# Patient Record
Sex: FEMALE | Race: BLACK OR AFRICAN AMERICAN | NOT HISPANIC OR LATINO | Employment: FULL TIME | URBAN - METROPOLITAN AREA
[De-identification: names, ages, dates, MRNs, and addresses within clinical notes are randomized per-mention and may not be internally consistent; named-entity substitution may affect disease eponyms.]

---

## 2017-01-30 ENCOUNTER — OFFICE VISIT (OUTPATIENT)
Dept: FAMILY MEDICINE | Facility: CLINIC | Age: 47
End: 2017-01-30
Payer: COMMERCIAL

## 2017-01-30 VITALS
DIASTOLIC BLOOD PRESSURE: 80 MMHG | WEIGHT: 143.19 LBS | HEIGHT: 60 IN | TEMPERATURE: 99 F | BODY MASS INDEX: 28.11 KG/M2 | SYSTOLIC BLOOD PRESSURE: 120 MMHG | HEART RATE: 71 BPM | OXYGEN SATURATION: 99 %

## 2017-01-30 DIAGNOSIS — J01.80 OTHER ACUTE SINUSITIS: Primary | ICD-10-CM

## 2017-01-30 PROCEDURE — 99213 OFFICE O/P EST LOW 20 MIN: CPT | Mod: 25,S$GLB,,

## 2017-01-30 PROCEDURE — 1159F MED LIST DOCD IN RCRD: CPT | Mod: S$GLB,,,

## 2017-01-30 PROCEDURE — 96372 THER/PROPH/DIAG INJ SC/IM: CPT | Mod: S$GLB,,,

## 2017-01-30 PROCEDURE — 99999 PR PBB SHADOW E&M-EST. PATIENT-LVL III: CPT | Mod: PBBFAC,,,

## 2017-01-30 RX ORDER — TRIAMCINOLONE ACETONIDE 40 MG/ML
40 INJECTION, SUSPENSION INTRA-ARTICULAR; INTRAMUSCULAR
Status: COMPLETED | OUTPATIENT
Start: 2017-01-30 | End: 2017-01-30

## 2017-01-30 RX ORDER — PROMETHAZINE HYDROCHLORIDE AND CODEINE PHOSPHATE 6.25; 1 MG/5ML; MG/5ML
5 SOLUTION ORAL EVERY 4 HOURS PRN
Qty: 180 ML | Refills: 0 | Status: SHIPPED | OUTPATIENT
Start: 2017-01-30 | End: 2017-02-09

## 2017-01-30 RX ADMIN — TRIAMCINOLONE ACETONIDE 40 MG: 40 INJECTION, SUSPENSION INTRA-ARTICULAR; INTRAMUSCULAR at 12:01

## 2017-01-30 NOTE — MR AVS SNAPSHOT
[facility]  [address]  [place] [address]  Phone: [phone]  Fax: [phone]                  Herminia Boyle   2017 11:40 AM   Office Visit    Description:  Female : 1970   Provider:  Morgan Arreola Jr., MD   Department:  [facility]           Reason for Visit     Sinus Problem           Diagnoses this Visit        Comments    Other acute sinusitis    -  Primary            To Do List           Goals (5 Years of Data)     None      Follow-Up and Disposition     Return if symptoms worsen or fail to improve.       These Medications        Disp Refills Start End    promethazine-codeine 6.25-10 mg/5 ml (PHENERGAN WITH CODEINE) 6.25-10 mg/5 mL syrup 180 mL 0 2017    Take 5 mLs by mouth every 4 (four) hours as needed. - Oral    Pharmacy: [facility] [place]   [facility] Ph #: [phone]         [facility] On [facility]     [facility] On Call Nurse Care Line -  Assistance  Registered nurses in the [facility] On Call Center provide clinical advisement, health education, appointment booking, and other advisory services.  Call for this free service at [phone].             Medications           Message regarding Medications     Verify the changes and/or additions to your medication regime listed below are the same as discussed with your clinician today.  If any of these changes or additions are incorrect, please notify your healthcare provider.        START taking these NEW medications        Refills    promethazine-codeine 6.25-10 mg/5 ml (PHENERGAN WITH CODEINE) 6.25-10 mg/5 mL syrup 0    Sig: Take 5 mLs by mouth every 4 (four) hours as needed.    Class: Normal    Route: Oral      These medications were administered today        Dose Freq    triamcinolone acetonide injection 40 mg 40 mg Clinic/HOD 1 time    Sig: Inject 1 mL (40 mg total) into the muscle one time.    Class: Normal    Route: Intramuscular           Verify  that the below list of medications is an accurate representation of the medications you are currently taking.  If none reported, the list may be blank. If incorrect, please contact your healthcare provider. Carry this list with you in case of emergency.           Current Medications     levocetirizine (XYZAL) 5 MG tablet Take 1 tablet (5 mg total) by mouth every evening.    lisinopril (PRINIVIL,ZESTRIL) 20 MG tablet     ondansetron (ZOFRAN-ODT) 8 MG TbDL Take 1 tablet (8 mg total) by mouth every 12 (twelve) hours as needed.    promethazine-codeine 6.25-10 mg/5 ml (PHENERGAN WITH CODEINE) 6.25-10 mg/5 mL syrup Take 5 mLs by mouth every 4 (four) hours as needed.           Clinical Reference Information           Vital Signs - Last Recorded  Most recent update: 1/30/2017 11:48 AM by Travis Bolden MA    BP Pulse Temp Ht Wt SpO2    120/80 (BP Location: Right arm, Patient Position: Sitting, BP Method: Manual) 71 98.7 °F (37.1 °C) 5' (1.524 m) 64.9 kg (143 lb 3 oz) 99%    BMI                27.96 kg/m2          Blood Pressure          Most Recent Value    BP  120/80      Allergies as of 1/30/2017     No Known Allergies      Immunizations Administered on Date of Encounter - 1/30/2017     None      [facility] Sign-Up     Activating your [facility] account is as easy as 1-2-3!     1) Visit [email or web address], select Sign Up Now, enter this activation code and your date of birth, then select Next.  Activation code not generated  Current Patient Portal Status: Account disabled      2) Create a username and password to use when you visit [facility] in the future and select a security question in case you lose your password and select Next.    3) Enter your e-mail address and click Sign Up!    Additional Information  If you have questions, please e-mail [email or web address] or call [phone] to talk to our [facility] staff. Remember, [facility] is NOT to be used for urgent needs. For medical emergencies, dial 911.

## 2017-01-30 NOTE — PROGRESS NOTES
Chief Complaint   Patient presents with    Sinus Problem       HPI  Herminia Boyle is a 46 y.o. female who presents to the office after about 2 days of sinus drip, lots of sniffles, cough.  No chills, fever, no sore throat, no ear pain.  Patient is generally in good health, her PCP is Dr. Hill.      HPI    PAST MEDICAL HISTORY:  Past Medical History   Diagnosis Date    Acid reflux     Allergy     Anemia     Hypertension        PAST SURGICAL HISTORY:  Past Surgical History   Procedure Laterality Date    Tubal ligation         SOCIAL HISTORY:  Social History     Social History    Marital status: Single     Spouse name: N/A    Number of children: N/A    Years of education: N/A     Occupational History    Not on file.     Social History Main Topics    Smoking status: Former Smoker    Smokeless tobacco: Not on file    Alcohol use 0.0 oz/week     0 Standard drinks or equivalent per week      Comment: socially    Drug use: No    Sexual activity: Yes     Other Topics Concern    Not on file     Social History Narrative       FAMILY HISTORY:  Family History   Problem Relation Age of Onset    Hypertension Mother     Hypertension Sister        ALLERGIES AND MEDICATIONS: updated and reviewed.  Review of patient's allergies indicates:  No Known Allergies  Current Outpatient Prescriptions   Medication Sig Dispense Refill    levocetirizine (XYZAL) 5 MG tablet Take 1 tablet (5 mg total) by mouth every evening. 30 tablet 0    lisinopril (PRINIVIL,ZESTRIL) 20 MG tablet   5    ondansetron (ZOFRAN-ODT) 8 MG TbDL Take 1 tablet (8 mg total) by mouth every 12 (twelve) hours as needed. 12 tablet 0    promethazine-codeine 6.25-10 mg/5 ml (PHENERGAN WITH CODEINE) 6.25-10 mg/5 mL syrup Take 5 mLs by mouth every 4 (four) hours as needed. 180 mL 0     No current facility-administered medications for this visit.        ROS  Review of Systems   Constitutional: Negative for appetite change and fever.   HENT: Negative for  ear pain and sinus pressure.    Respiratory: Positive for cough. Negative for wheezing.        Physical Exam  Vitals:    01/30/17 1146   BP: 120/80   Pulse: 71   Temp: 98.7 °F (37.1 °C)    Body mass index is 27.96 kg/(m^2).  Weight: 64.9 kg (143 lb 3 oz)   Height: 5' (152.4 cm)     Physical Exam   Constitutional: She appears well-developed and well-nourished. No distress.   HENT:   Tympanic membranes are normal.  Sinuses are nontender to percussion.  Oropharynx is clear without exudate or erythema.   Eyes: Conjunctivae are normal. Pupils are equal, round, and reactive to light.   Cardiovascular: Normal rate and regular rhythm.    Pulmonary/Chest: Effort normal and breath sounds normal.   Clear lungs.   Vitals reviewed.      Health Maintenance       Date Due Completion Date    Lipid Panel 1970 ---    TETANUS VACCINE 8/20/1988 ---    Influenza Vaccine 8/1/2016 ---    Mammogram 1/30/2017 1/30/2015 (Done)    Override on 1/30/2015: Done    Pap Smear 12/19/2019 12/19/2016          Assessment & Plan    1. Other acute sinusitis  No need for antibiotics.  I explained that this is similar to an ALLERGIC reaction.  Continue with Allegra, loratadine, etc.  Note for work was given today.  - triamcinolone acetonide injection 40 mg; Inject 1 mL (40 mg total) into the muscle one time.  - promethazine-codeine 6.25-10 mg/5 ml (PHENERGAN WITH CODEINE) 6.25-10 mg/5 mL syrup; Take 5 mLs by mouth every 4 (four) hours as needed.  Dispense: 180 mL; Refill: 0      Return if symptoms worsen or fail to improve.

## 2017-04-26 ENCOUNTER — OFFICE VISIT (OUTPATIENT)
Dept: FAMILY MEDICINE | Facility: CLINIC | Age: 47
End: 2017-04-26
Payer: COMMERCIAL

## 2017-04-26 VITALS
DIASTOLIC BLOOD PRESSURE: 70 MMHG | HEART RATE: 74 BPM | OXYGEN SATURATION: 98 % | HEIGHT: 64 IN | TEMPERATURE: 99 F | WEIGHT: 135.38 LBS | SYSTOLIC BLOOD PRESSURE: 118 MMHG | BODY MASS INDEX: 23.11 KG/M2

## 2017-04-26 DIAGNOSIS — J30.2 SEASONAL ALLERGIC RHINITIS, UNSPECIFIED ALLERGIC RHINITIS TRIGGER: Primary | ICD-10-CM

## 2017-04-26 PROCEDURE — 1160F RVW MEDS BY RX/DR IN RCRD: CPT | Mod: S$GLB,,, | Performed by: INTERNAL MEDICINE

## 2017-04-26 PROCEDURE — 99213 OFFICE O/P EST LOW 20 MIN: CPT | Mod: S$GLB,,, | Performed by: INTERNAL MEDICINE

## 2017-04-26 PROCEDURE — 99999 PR PBB SHADOW E&M-EST. PATIENT-LVL III: CPT | Mod: PBBFAC,,, | Performed by: INTERNAL MEDICINE

## 2017-04-26 RX ORDER — CETIRIZINE HYDROCHLORIDE 10 MG/1
10 TABLET ORAL DAILY
COMMUNITY
End: 2023-07-31

## 2017-04-26 RX ORDER — FLUTICASONE PROPIONATE 50 MCG
SPRAY, SUSPENSION (ML) NASAL
Qty: 16 G | Refills: 11 | Status: SHIPPED | OUTPATIENT
Start: 2017-04-26 | End: 2023-01-15 | Stop reason: CLARIF

## 2017-04-26 RX ORDER — BENZONATATE 100 MG/1
100 CAPSULE ORAL 3 TIMES DAILY PRN
Qty: 30 CAPSULE | Refills: 0 | Status: SHIPPED | OUTPATIENT
Start: 2017-04-26 | End: 2017-05-06

## 2017-04-26 NOTE — MR AVS SNAPSHOT
[facility]  [address]  [place] [address]  Phone: [phone]  Fax: [phone]                  Herminia Boyle   2017 3:20 PM   Office Visit    Description:  Female : 1970   Provider:  Leonardo Trujillo MD   Department:  [facility]           Reason for Visit     Chest Congestion     Sinus Problem           Diagnoses this Visit        Comments    Seasonal allergic rhinitis, unspecified allergic rhinitis trigger    -  Primary            To Do List           Goals (5 Years of Data)     None       These Medications        Disp Refills Start End    fluticasone (FLONASE) 50 mcg/actuation nasal spray 16 g 11 2017     2 sprays qnostril daily x 7 days, then decr to 1 spray qnostril    Pharmacy: [facility] - [place] - [address] [place] [address] Ph #: [phone]       benzonatate (TESSALON) 100 MG capsule 30 capsule 0 2017    Take 1 capsule (100 mg total) by mouth 3 (three) times daily as needed for Cough. - Oral    Pharmacy: [facility] [place] - [address] [place] [address] Ph #: [phone]         [facility]     [facility] On Call Nurse Care Line -  Assistance  Unless otherwise directed by your provider, please contact [facility], our nurse care line that is available for  assistance.     Registered nurses in the [facility] Center provide: appointment scheduling, clinical advisement, health education, and other advisory services.  Call: [phone] (toll free)               Medications           Message regarding Medications     Verify the changes and/or additions to your medication regime listed below are the same as discussed with your clinician today.  If any of these changes or additions are incorrect, please notify your healthcare provider.        START taking these NEW medications        Refills    fluticasone (FLONASE) 50 mcg/actuation nasal spray 11    Si sprays qnostril daily x 7 days, then  "decr to 1 spray qnostril    Class: Normal    benzonatate (TESSALON) 100 MG capsule 0    Sig: Take 1 capsule (100 mg total) by mouth 3 (three) times daily as needed for Cough.    Class: Normal    Route: Oral      STOP taking these medications     ondansetron (ZOFRAN-ODT) 8 MG TbDL Take 1 tablet (8 mg total) by mouth every 12 (twelve) hours as needed.    levocetirizine (XYZAL) 5 MG tablet Take 1 tablet (5 mg total) by mouth every evening.           Verify that the below list of medications is an accurate representation of the medications you are currently taking.  If none reported, the list may be blank. If incorrect, please contact your healthcare provider. Carry this list with you in case of emergency.           Current Medications     cetirizine (ZYRTEC) 10 MG tablet Take 10 mg by mouth once daily.    lisinopril (PRINIVIL,ZESTRIL) 20 MG tablet Take 20 mg by mouth once daily.     benzonatate (TESSALON) 100 MG capsule Take 1 capsule (100 mg total) by mouth 3 (three) times daily as needed for Cough.    fluticasone (FLONASE) 50 mcg/actuation nasal spray 2 sprays qnostril daily x 7 days, then decr to 1 spray qnostril           Clinical Reference Information           Your Vitals Were     BP Pulse Temp Height Weight Last Period    118/70 74 98.9 °F (37.2 °C) 5' 4" (1.626 m) 61.4 kg (135 lb 5.8 oz) 04/19/2017    SpO2 BMI             98% 23.23 kg/m2         Blood Pressure          Most Recent Value    BP  118/70      Allergies as of 4/26/2017     No Known Allergies      Immunizations Administered on Date of Encounter - 4/26/2017     None      Instructions    AVOID AFRIN NASAL SPRAY (OXYMETAZOLINE)       Language Assistance Services     ATTENTION: Language assistance services are available, free of charge. Please call [phone].      ATENCIÓN: Si habla español, tiene a levine disposición servicios gratuitos de asistencia lingüística. Llame al [phone].     CHÚ Ý: N?u b?n nói Ti?ng Vi?t, có các d?ch v? h? tr? ngôn ng? " mi?n phí dành cho b?n. G?i s? [phone].         [facility] complies with applicable Federal civil rights laws and does not discriminate on the basis of race, color, national origin, age, disability, or sex.

## 2017-04-26 NOTE — LETTER
April 26, 2017      [facility]  [address] [facility] [address]  [place] [address]  Phone: [phone]  Fax: [phone]       Patient: Herminia Boyle   YOB: 1970  Date of Visit: 04/26/2017    To Whom It May Concern:    Herminia was at [facility] on 04/26/2017 for acute medical issue.      Sincerely,      Leonardo Trujillo MD

## 2017-04-26 NOTE — PROGRESS NOTES
Assessment & Plan  Seasonal allergic rhinitis, unspecified allergic rhinitis trigger - no indication for antibiotics.  Flonase; continue Zyrtec; benzonatate PRN use.  Expect that better sinus control, will need cough meds less.  -     fluticasone (FLONASE) 50 mcg/actuation nasal spray; 2 sprays qnostril daily x 7 days, then decr to 1 spray qnostril  Dispense: 16 g; Refill: 11  -     benzonatate (TESSALON) 100 MG capsule; Take 1 capsule (100 mg total) by mouth 3 (three) times daily as needed for Cough.  Dispense: 30 capsule; Refill: 0      Medications Discontinued During This Encounter   Medication Reason    ondansetron (ZOFRAN-ODT) 8 MG TbDL     levocetirizine (XYZAL) 5 MG tablet        Follow-up: No Follow-up on file.      =================================================================      Chief Complaint   Patient presents with    Chest Congestion    Sinus Problem       JD Hope is a 46 y.o. female, last appointment with this clinic was 1/30/2017.    Patient's last menstrual period was 04/19/2017.    Allergies x 2 days with congestion.  Hx of zyrtec and hx of nasal spray and cough syrup.  No fever no chills with this but sinus headache.  Sick contact -  with sinusitis.  Cough worse @ night, dry.  No chest congestion    Patient Care Team:  Hillary Hill MD as PCP - General (Internal Medicine)    There are no active problems to display for this patient.      PAST MEDICAL HISTORY:  Past Medical History:   Diagnosis Date    Acid reflux     Allergy     Anemia     Hypertension        PAST SURGICAL HISTORY:  Past Surgical History:   Procedure Laterality Date    TUBAL LIGATION         SOCIAL HISTORY:  Social History     Social History    Marital status: Single     Spouse name: N/A    Number of children: N/A    Years of education: N/A     Occupational History    [employer]     Social History Main Topics    Smoking status: Former Smoker    Smokeless tobacco: Not on file  "   Alcohol use 0.0 oz/week     0 Standard drinks or equivalent per week      Comment: socially    Drug use: No    Sexual activity: Yes     Other Topics Concern    Not on file     Social History Narrative       ALLERGIES AND MEDICATIONS: updated and reviewed.  Review of patient's allergies indicates:  No Known Allergies  Current Outpatient Prescriptions   Medication Sig Dispense Refill    cetirizine (ZYRTEC) 10 MG tablet Take 10 mg by mouth once daily.      lisinopril (PRINIVIL,ZESTRIL) 20 MG tablet Take 20 mg by mouth once daily.   5     No current facility-administered medications for this visit.        Review of Systems   Constitutional: Negative for chills, fever and malaise/fatigue.   HENT: Positive for congestion. Negative for ear pain, hearing loss and sore throat.    Respiratory: Negative for cough, sputum production and shortness of breath.    Cardiovascular: Negative for chest pain.   Musculoskeletal: Negative for myalgias and neck pain.   Skin: Negative for rash.   Neurological: Negative for headaches.       Physical Exam   Vitals:    04/26/17 1521   BP: 118/70   Pulse: 74   Temp: 98.9 °F (37.2 °C)   SpO2: 98%   Weight: 61.4 kg (135 lb 5.8 oz)   Height: 5' 4" (1.626 m)    Body mass index is 23.23 kg/(m^2).  Weight: 61.4 kg (135 lb 5.8 oz)   Height: 5' 4" (162.6 cm)     Physical Exam   Constitutional: She is oriented to person, place, and time. She appears well-developed and well-nourished.   HENT:   Right Ear: Tympanic membrane and ear canal normal.   Left Ear: Tympanic membrane and ear canal normal.   Mouth/Throat: No oral lesions. No oropharyngeal exudate or posterior oropharyngeal erythema.   Maxillary sinuses tender on palpation bilaterally   Eyes: EOM are normal.   Neck: Neck supple.   Cardiovascular: Normal rate, regular rhythm and normal heart sounds.    Pulmonary/Chest: Effort normal and breath sounds normal. She has no wheezes.   Lymphadenopathy:     She has no cervical adenopathy. "   Neurological: She is alert and oriented to person, place, and time.   Skin: Skin is warm and dry.   Psychiatric: She has a normal mood and affect. Her behavior is normal.

## 2017-05-10 ENCOUNTER — OFFICE VISIT (OUTPATIENT)
Dept: FAMILY MEDICINE | Facility: CLINIC | Age: 47
End: 2017-05-10
Payer: COMMERCIAL

## 2017-05-10 VITALS
HEART RATE: 63 BPM | OXYGEN SATURATION: 99 % | TEMPERATURE: 99 F | HEIGHT: 64 IN | SYSTOLIC BLOOD PRESSURE: 122 MMHG | DIASTOLIC BLOOD PRESSURE: 77 MMHG | WEIGHT: 141.44 LBS | BODY MASS INDEX: 24.15 KG/M2

## 2017-05-10 DIAGNOSIS — K52.9 GASTROENTERITIS: Primary | ICD-10-CM

## 2017-05-10 PROCEDURE — 99999 PR PBB SHADOW E&M-EST. PATIENT-LVL III: CPT | Mod: PBBFAC,,, | Performed by: INTERNAL MEDICINE

## 2017-05-10 PROCEDURE — 99213 OFFICE O/P EST LOW 20 MIN: CPT | Mod: S$GLB,,, | Performed by: INTERNAL MEDICINE

## 2017-05-10 PROCEDURE — 1160F RVW MEDS BY RX/DR IN RCRD: CPT | Mod: S$GLB,,, | Performed by: INTERNAL MEDICINE

## 2017-05-10 RX ORDER — ONDANSETRON 4 MG/1
4 TABLET, FILM COATED ORAL EVERY 6 HOURS PRN
Qty: 8 TABLET | Refills: 0 | Status: SHIPPED | OUTPATIENT
Start: 2017-05-10 | End: 2017-05-15

## 2017-05-10 NOTE — PATIENT INSTRUCTIONS
Treating Diarrhea    Diarrhea happens when you have loose, watery, or frequent bowel movements. It is a common problem with many causes. Most cases of diarrhea clear up on their own. But certain cases may need treatment. Be sure to see your healthcare provider if your symptoms do not improve within a few days.  Getting relief  Treatment of diarrhea depends on its cause. Diarrhea caused by bacterial or parasite infection is often treated with antibiotics. Diarrhea caused by other factors, such as a stomach virus, often improves with simple home treatment. The tips below may also help relieve your symptoms.  · Drink plenty of fluids. This helps prevent too much fluid loss (dehydration). Water, clear soups, and electrolyte solutions are good choices. Avoid alcohol, coffee, tea, and milk. These can irritate your intestines and make symptoms worse.  · Suck on ice chips if drinking makes you queasy.  · Return to your normal diet slowly. You may want to eat bland foods at first, such as rice and toast. Also, you may need to avoid certain foods for a while, such as dairy products. These can make symptoms worse. Ask your healthcare provider if there are any other foods you should avoid.  · If you were prescribed antibiotics, take them as directed.  · Do not take anti-diarrhea medicines without asking your healthcare provider first.  Call your healthcare provider   Call your healthcare provider if you have any of the following:   · A fever of 100.4°F (38.0°C) or higher, or as directed by your healthcare provider  · Severe pain  · Worsening diarrhea or diarrhea for more than 2 days  · Bloody vomit or stool  · Signs of dehydration (dizziness, dry mouth and tongue, rapid pulse, dark urine)  Date Last Reviewed: 7/1/2016 © 2000-2016 [employer]. [address] [place] [address], [place] [address]. All rights reserved. This information is not intended as a substitute for professional medical care. Always follow your  healthcare professional's instructions.        Diet for Vomiting or Diarrhea (Adult)    Your symptoms may return or get worse after eating certain foods listed below. If this happens, stop eating these foods until your symptoms ease and you feel better.  Once the vomiting stops, follow the steps below.   During the first 12 to 24 hours  During the first 12 to 24 hours, follow this diet:  · Drinks. Plain water, sport drinks like electrolyte solutions, soft drinks without caffeine, mineral water (plain or flavored), clear fruit juices, and decaffeinated tea and coffee.  · Soups. Clear broth.  · Desserts. Plain gelatin, popsicles, and fruit juice bars. As you feel better, you may add 6 to 8 ounces of yogurt per day. If you have diarrhea, don't have foods or drinks that contain sugar, high-fructose corn syrup, or sugar alcohols.  During the next 24 hours  During the next 24 hours you may add the following to the above:  · Hot cereal, plain toast, bread, rolls, and crackers  · Plain noodles, rice, mashed potatoes, and chicken noodle or rice soup  · Unsweetened canned fruit (but not pineapple) and bananas  Don't eat more than 15 grams of fat a day. Do this by staying away from margarine, butter, oils, mayonnaise, sauces, gravies, fried foods, peanut butter, meat, poultry, and fish.  Don't eat much fiber. Stay away from raw or cooked vegetables, fresh fruits (except bananas), and bran cereals.  Limit how much caffeine and chocolate you have. Do not use any spices or seasonings except salt.  During the next 24 hours  Slowly go back to your normal diet, as you feel better and your symptoms ease.  Date Last Reviewed: 8/1/2016  © 3178-2258 [employer]. [address] [place] [address], [place] [address]. All rights reserved. This information is not intended as a substitute for professional medical care. Always follow your healthcare professional's instructions.

## 2017-05-10 NOTE — MR AVS SNAPSHOT
[facility]  [address]  [place] [address]  Phone: [phone]  Fax: [phone]                  Herminia Boyle   5/10/2017 2:00 PM   Office Visit    Description:  Female : 1970   Provider:  Leonardo Trujillo MD   Department:  [facility]           Reason for Visit     Emesis     Diarrhea           Diagnoses this Visit        Comments    Gastroenteritis    -  Primary            To Do List           Goals (5 Years of Data)     None       These Medications        Disp Refills Start End    ondansetron (ZOFRAN) 4 MG tablet 8 tablet 0 5/10/2017 5/15/2017    Take 1 tablet (4 mg total) by mouth every 6 (six) hours as needed for Nausea. - Oral    Pharmacy: [facility] Ph #: [phone]         [facility] Call     [facility] On Call Nurse Care Line -  Assistance  Unless otherwise directed by your provider, please contact [facility], our nurse care line that is available for  assistance.     Registered nurses in the [facility] On Call Center provide: appointment scheduling, clinical advisement, health education, and other advisory services.  Call: [phone] (toll free)               Medications           Message regarding Medications     Verify the changes and/or additions to your medication regime listed below are the same as discussed with your clinician today.  If any of these changes or additions are incorrect, please notify your healthcare provider.        START taking these NEW medications        Refills    ondansetron (ZOFRAN) 4 MG tablet 0    Sig: Take 1 tablet (4 mg total) by mouth every 6 (six) hours as needed for Nausea.    Class: Normal    Route: Oral           Verify that the below list of medications is an accurate representation of the medications you are currently taking.  If none reported, the list may be blank. If incorrect, please contact your healthcare provider. Carry this list with you in  "case of emergency.           Current Medications     cetirizine (ZYRTEC) 10 MG tablet Take 10 mg by mouth once daily.    fluticasone (FLONASE) 50 mcg/actuation nasal spray 2 sprays qnostril daily x 7 days, then decr to 1 spray qnostril    lisinopril (PRINIVIL,ZESTRIL) 20 MG tablet Take 20 mg by mouth once daily.     ondansetron (ZOFRAN) 4 MG tablet Take 1 tablet (4 mg total) by mouth every 6 (six) hours as needed for Nausea.           Clinical Reference Information           Your Vitals Were     BP Pulse Temp Height Weight Last Period    122/77 63 98.9 °F (37.2 °C) (Oral) 5' 4" (1.626 m) 64.1 kg (141 lb 6.8 oz) 04/19/2017    SpO2 BMI             99% 24.28 kg/m2         Blood Pressure          Most Recent Value    BP  122/77      Allergies as of 5/10/2017     No Known Allergies      Immunizations Administered on Date of Encounter - 5/10/2017     None      Instructions      Treating Diarrhea    Diarrhea happens when you have loose, watery, or frequent bowel movements. It is a common problem with many causes. Most cases of diarrhea clear up on their own. But certain cases may need treatment. Be sure to see your healthcare provider if your symptoms do not improve within a few days.  Getting relief  Treatment of diarrhea depends on its cause. Diarrhea caused by bacterial or parasite infection is often treated with antibiotics. Diarrhea caused by other factors, such as a stomach virus, often improves with simple home treatment. The tips below may also help relieve your symptoms.  · Drink plenty of fluids. This helps prevent too much fluid loss (dehydration). Water, clear soups, and electrolyte solutions are good choices. Avoid alcohol, coffee, tea, and milk. These can irritate your intestines and make symptoms worse.  · Suck on ice chips if drinking makes you queasy.  · Return to your normal diet slowly. You may want to eat bland foods at first, such as rice and toast. Also, you may need to avoid certain foods for a " while, such as dairy products. These can make symptoms worse. Ask your healthcare provider if there are any other foods you should avoid.  · If you were prescribed antibiotics, take them as directed.  · Do not take anti-diarrhea medicines without asking your healthcare provider first.  Call your healthcare provider   Call your healthcare provider if you have any of the following:   · A fever of 100.4°F (38.0°C) or higher, or as directed by your healthcare provider  · Severe pain  · Worsening diarrhea or diarrhea for more than 2 days  · Bloody vomit or stool  · Signs of dehydration (dizziness, dry mouth and tongue, rapid pulse, dark urine)  Date Last Reviewed: 7/1/2016 © 2000-2016 [employer]. [address] [place] [address], [place] [address]. All rights reserved. This information is not intended as a substitute for professional medical care. Always follow your healthcare professional's instructions.        Diet for Vomiting or Diarrhea (Adult)    Your symptoms may return or get worse after eating certain foods listed below. If this happens, stop eating these foods until your symptoms ease and you feel better.  Once the vomiting stops, follow the steps below.   During the first 12 to 24 hours  During the first 12 to 24 hours, follow this diet:  · Drinks. Plain water, sport drinks like electrolyte solutions, soft drinks without caffeine, mineral water (plain or flavored), clear fruit juices, and decaffeinated tea and coffee.  · Soups. Clear broth.  · Desserts. Plain gelatin, popsicles, and fruit juice bars. As you feel better, you may add 6 to 8 ounces of yogurt per day. If you have diarrhea, don't have foods or drinks that contain sugar, high-fructose corn syrup, or sugar alcohols.  During the next 24 hours  During the next 24 hours you may add the following to the above:  · Hot cereal, plain toast, bread, rolls, and crackers  · Plain noodles, rice, mashed potatoes, and chicken noodle or rice  soup  · Unsweetened canned fruit (but not pineapple) and bananas  Don't eat more than 15 grams of fat a day. Do this by staying away from margarine, butter, oils, mayonnaise, sauces, gravies, fried foods, peanut butter, meat, poultry, and fish.  Don't eat much fiber. Stay away from raw or cooked vegetables, fresh fruits (except bananas), and bran cereals.  Limit how much caffeine and chocolate you have. Do not use any spices or seasonings except salt.  During the next 24 hours  Slowly go back to your normal diet, as you feel better and your symptoms ease.  Date Last Reviewed: 8/1/2016  © 0485-1777 [employer]. [address] [place] [address], [place] [address]. All rights reserved. This information is not intended as a substitute for professional medical care. Always follow your healthcare professional's instructions.             Language Assistance Services     ATTENTION: Language assistance services are available, free of charge. Please call [phone].      ATENCIÓN: Si habla español, tiene a levine disposición servicios gratuitos de asistencia lingüística. Llame al [phone].     XAVIER Ý: N?u b?n nói Ti?ng Vi?t, có các d?ch v? h? tr? ngôn ng? mi?n phí dành cho b?n. G?i s? [phone].         [facility] complies with applicable Federal civil rights laws and does not discriminate on the basis of race, color, national origin, age, disability, or sex.

## 2017-05-10 NOTE — LETTER
May 10, 2017      [facility]  [address] [facility] [address]  [place] [address]  Phone: [phone]  Fax: [phone]       Patient: Herminia Boyle   YOB: 1970  Date of Visit: 05/10/2017    To Whom It May Concern:    Herminia was at [facility] on 05/10/2017. She may return to work/school on Friday May 12 with no restrictions. If you have any questions or concerns, or if I can be of further assistance, please do not hesitate to contact me.      Sincerely,        Leonardo Trujillo MD

## 2017-08-26 ENCOUNTER — HOSPITAL ENCOUNTER (EMERGENCY)
Facility: OTHER | Age: 47
Discharge: HOME | End: 2017-08-26
Attending: EMERGENCY MEDICINE
Payer: COMMERCIAL

## 2017-08-26 VITALS
RESPIRATION RATE: 18 BRPM | OXYGEN SATURATION: 100 % | HEART RATE: 67 BPM | TEMPERATURE: 100 F | DIASTOLIC BLOOD PRESSURE: 85 MMHG | SYSTOLIC BLOOD PRESSURE: 161 MMHG

## 2017-08-26 DIAGNOSIS — N39.0 URINARY TRACT INFECTION WITHOUT HEMATURIA, SITE UNSPECIFIED: Primary | ICD-10-CM

## 2017-08-26 LAB
B-HCG UR QL: NEGATIVE
BILIRUBIN, POC UA: NEGATIVE
BLOOD, POC UA: ABNORMAL
CLARITY, POC UA: ABNORMAL
COLOR, POC UA: ABNORMAL
CTP QC/QA: YES
GLUCOSE, POC UA: NEGATIVE
KETONES, POC UA: ABNORMAL
LEUKOCYTE EST, POC UA: ABNORMAL
NITRITE, POC UA: NEGATIVE
PH UR STRIP: 5.5 [PH]
PROTEIN, POC UA: NEGATIVE
SPECIFIC GRAVITY, POC UA: 1.02
UROBILINOGEN, POC UA: 0.2 E.U./DL

## 2017-08-26 PROCEDURE — 81025 URINE PREGNANCY TEST: CPT | Performed by: EMERGENCY MEDICINE

## 2017-08-26 PROCEDURE — 81003 URINALYSIS AUTO W/O SCOPE: CPT

## 2017-08-26 PROCEDURE — 99283 EMERGENCY DEPT VISIT LOW MDM: CPT | Mod: 25

## 2017-08-26 RX ORDER — NITROFURANTOIN 25; 75 MG/1; MG/1
100 CAPSULE ORAL 2 TIMES DAILY
Qty: 14 CAPSULE | Refills: 0 | Status: SHIPPED | OUTPATIENT
Start: 2017-08-26 | End: 2017-09-02

## 2017-08-26 RX ORDER — PHENAZOPYRIDINE HYDROCHLORIDE 200 MG/1
200 TABLET, FILM COATED ORAL 3 TIMES DAILY
Qty: 6 TABLET | Refills: 0 | Status: SHIPPED | OUTPATIENT
Start: 2017-08-26 | End: 2017-09-05

## 2017-08-26 RX ORDER — PHENAZOPYRIDINE HYDROCHLORIDE 200 MG/1
200 TABLET, FILM COATED ORAL 3 TIMES DAILY
Qty: 6 TABLET | Refills: 0 | Status: SHIPPED | OUTPATIENT
Start: 2017-08-26 | End: 2017-08-26

## 2017-08-26 NOTE — ED PROVIDER NOTES
Encounter Date: 8/26/2017       History     Chief Complaint   Patient presents with    Dysuria     pateint reports having painful urination X1 day     The history is provided by the patient.   Dysuria    This is a new problem. The current episode started yesterday. The problem occurs every urination. The problem has been unchanged. The quality of the pain is described as burning. The pain is at a severity of 3/10. She is sexually active. There is no history of pyelonephritis. Associated symptoms include frequency, hesitancy and urgency. Pertinent negatives include no chills and no discharge. She has tried nothing for the symptoms.     Review of patient's allergies indicates:  No Known Allergies  Past Medical History:   Diagnosis Date    Acid reflux     Allergy     Anemia     Hypertension      Past Surgical History:   Procedure Laterality Date    TUBAL LIGATION       Family History   Problem Relation Age of Onset    Hypertension Mother     Hypertension Sister     Cirrhosis Father     Alcohol abuse Father      Social History   Substance Use Topics    Smoking status: Former Smoker    Smokeless tobacco: Never Used    Alcohol use 0.0 oz/week      Comment: socially     Review of Systems   Constitutional: Negative.  Negative for chills.   HENT: Negative.    Eyes: Negative.    Respiratory: Negative.    Cardiovascular: Negative.    Gastrointestinal: Negative.    Endocrine: Negative.    Genitourinary: Positive for dysuria, frequency, hesitancy and urgency.   Musculoskeletal: Negative.    Skin: Negative.    Allergic/Immunologic: Negative.    Neurological: Negative.    Hematological: Negative.    Psychiatric/Behavioral: Negative.    All other systems reviewed and are negative.      Physical Exam     Initial Vitals [08/26/17 1708]   BP Pulse Resp Temp SpO2   (!) 161/85 67 18 100.1 °F (37.8 °C) 100 %      MAP       110.33         Physical Exam    Nursing note and vitals reviewed.  Constitutional: Vital signs are  normal. She appears well-developed. She is active and cooperative.   HENT:   Head: Normocephalic and atraumatic.   Eyes: Conjunctivae, EOM and lids are normal. Pupils are equal, round, and reactive to light.   Neck: Trachea normal and full passive range of motion without pain. Neck supple. No thyroid mass present.   Cardiovascular: Normal rate, regular rhythm, S1 normal, S2 normal, normal heart sounds, intact distal pulses and normal pulses.   Abdominal: Soft. Normal appearance, normal aorta and bowel sounds are normal. There is no hepatosplenomegaly. There is tenderness in the suprapubic area. There is no rigidity, no rebound, no guarding, no CVA tenderness, no tenderness at McBurney's point and negative Campbell's sign. No hernia.   Musculoskeletal: Normal range of motion.   Lymphadenopathy:     She has no axillary adenopathy.   Neurological: She is alert and oriented to person, place, and time.   Skin: Skin is warm, dry and intact.   Psychiatric: She has a normal mood and affect. Her speech is normal and behavior is normal. Judgment and thought content normal. Cognition and memory are normal.         ED Course   Procedures  Labs Reviewed   POCT URINALYSIS W/O SCOPE - Abnormal; Notable for the following:        Result Value    Glucose, UA Negative (*)     Bilirubin, UA Negative (*)     Ketones, UA 1+ (*)     Blood, UA Trace-intact (*)     Protein, UA Negative (*)     Nitrite, UA Negative (*)     Leukocytes, UA Trace (*)     All other components within normal limits   POCT URINE PREGNANCY   POCT URINALYSIS W/O SCOPE                               ED Course     Clinical Impression:   The encounter diagnosis was Urinary tract infection without hematuria, site unspecified.                           Suhail Sellers MD  08/26/17 9840

## 2018-02-15 ENCOUNTER — HOSPITAL ENCOUNTER (EMERGENCY)
Facility: OTHER | Age: 48
Discharge: HOME | End: 2018-02-15
Attending: EMERGENCY MEDICINE
Payer: COMMERCIAL

## 2018-02-15 VITALS
BODY MASS INDEX: 25.75 KG/M2 | HEART RATE: 64 BPM | OXYGEN SATURATION: 98 % | RESPIRATION RATE: 18 BRPM | TEMPERATURE: 99 F | DIASTOLIC BLOOD PRESSURE: 81 MMHG | SYSTOLIC BLOOD PRESSURE: 150 MMHG | WEIGHT: 150 LBS

## 2018-02-15 DIAGNOSIS — R35.0 URINARY FREQUENCY: Primary | ICD-10-CM

## 2018-02-15 DIAGNOSIS — M54.50 ACUTE BILATERAL LOW BACK PAIN WITHOUT SCIATICA: ICD-10-CM

## 2018-02-15 LAB
B-HCG UR QL: NEGATIVE
BILIRUBIN, POC UA: NEGATIVE
BLOOD, POC UA: NEGATIVE
CLARITY, POC UA: CLEAR
COLOR, POC UA: YELLOW
CTP QC/QA: YES
GLUCOSE, POC UA: NEGATIVE
KETONES, POC UA: NEGATIVE
LEUKOCYTE EST, POC UA: NEGATIVE
NITRITE, POC UA: NEGATIVE
PH UR STRIP: 7.5 [PH]
PROTEIN, POC UA: NEGATIVE
SPECIFIC GRAVITY, POC UA: 1.02
UROBILINOGEN, POC UA: 2 E.U./DL

## 2018-02-15 PROCEDURE — 87077 CULTURE AEROBIC IDENTIFY: CPT

## 2018-02-15 PROCEDURE — 99284 EMERGENCY DEPT VISIT MOD MDM: CPT

## 2018-02-15 PROCEDURE — 87186 SC STD MICRODIL/AGAR DIL: CPT

## 2018-02-15 PROCEDURE — 81003 URINALYSIS AUTO W/O SCOPE: CPT

## 2018-02-15 PROCEDURE — 87086 URINE CULTURE/COLONY COUNT: CPT

## 2018-02-15 PROCEDURE — 81025 URINE PREGNANCY TEST: CPT | Performed by: EMERGENCY MEDICINE

## 2018-02-15 PROCEDURE — 87088 URINE BACTERIA CULTURE: CPT

## 2018-02-15 RX ORDER — METHOCARBAMOL 750 MG/1
1500 TABLET, FILM COATED ORAL EVERY 6 HOURS
Qty: 24 TABLET | Refills: 0 | Status: SHIPPED | OUTPATIENT
Start: 2018-02-15 | End: 2018-02-18

## 2018-02-15 RX ORDER — IBUPROFEN 800 MG/1
800 TABLET ORAL EVERY 6 HOURS PRN
Qty: 20 TABLET | Refills: 0 | OUTPATIENT
Start: 2018-02-15 | End: 2022-08-05

## 2018-02-15 RX ORDER — CIPROFLOXACIN 500 MG/1
500 TABLET ORAL 2 TIMES DAILY
Qty: 20 TABLET | Refills: 0 | Status: SHIPPED | OUTPATIENT
Start: 2018-02-15 | End: 2018-02-25

## 2018-02-16 NOTE — ED PROVIDER NOTES
Encounter Date: 2/15/2018       History     Chief Complaint   Patient presents with    urinary frequency, back pain     reports back pain associated with burning with urination and frequency       Female  Problem   Primary symptoms include no pelvic pain, no fever, no dysuria, and no vaginal bleeding.   Primary symptoms comment: urinary frequency. This is a new problem. The current episode started two days ago. The problem occurs intermittently. The problem has been waxing and waning. The symptoms occur during urination. She is not pregnant. She has not missed her period. The patient's menstrual history has been regular. Associated symptoms include frequency. Pertinent negatives include no abdominal pain, no constipation, no diarrhea, no nausea, no vomiting, no light-headedness and no dizziness. She has tried nothing for the symptoms. Sexual activity: sexually active. There is no concern regarding sexually transmitted diseases. Associated medical issues do not include STD.   Back Pain    This is a new problem. The current episode started two days ago. The problem occurs intermittently. The problem has been waxing and waning. The pain is associated with lifting heavy objects. The pain is present in the lumbar spine. The quality of the pain is described as aching. The pain does not radiate. The symptoms are aggravated by certain positions and bending. The pain is the same all the time. Pertinent negatives include no fever, no abdominal pain, no bowel incontinence, no perianal numbness, no dysuria, no pelvic pain, no paresthesias, no paresis, no tingling and no weakness. She has tried nothing for the symptoms.     Review of patient's allergies indicates:  No Known Allergies  Past Medical History:   Diagnosis Date    Acid reflux     Allergy     Anemia     Hypertension      Past Surgical History:   Procedure Laterality Date    TUBAL LIGATION       Family History   Problem Relation Age of Onset    Hypertension  Mother     Hypertension Sister     Cirrhosis Father     Alcohol abuse Father      Social History   Substance Use Topics    Smoking status: Former Smoker    Smokeless tobacco: Never Used    Alcohol use 0.0 oz/week      Comment: socially     Review of Systems   Constitutional: Negative for fever.   Gastrointestinal: Negative for abdominal pain, bowel incontinence, constipation, diarrhea, nausea and vomiting.   Genitourinary: Positive for frequency. Negative for decreased urine volume, difficulty urinating, dysuria, flank pain, hematuria, pelvic pain, vaginal bleeding and vaginal discharge.   Musculoskeletal: Positive for back pain and myalgias. Negative for arthralgias, gait problem and joint swelling.   Neurological: Negative for dizziness, tingling, weakness, light-headedness and paresthesias.   All other systems reviewed and are negative.      Physical Exam     Initial Vitals [02/15/18 1740]   BP Pulse Resp Temp SpO2   (!) 150/81 64 18 98.6 °F (37 °C) 98 %      MAP       104         Physical Exam    Nursing note and vitals reviewed.  Constitutional: She appears well-developed and well-nourished. She is not diaphoretic. No distress.   HENT:   Head: Normocephalic and atraumatic.   Mouth/Throat: Oropharynx is clear and moist. No oropharyngeal exudate.   Eyes: EOM are normal. Pupils are equal, round, and reactive to light.   Neck: Normal range of motion. Neck supple.   Cardiovascular: Normal rate, regular rhythm and intact distal pulses.   No murmur heard.  Pulmonary/Chest: Breath sounds normal. No stridor. No respiratory distress.   Abdominal: Soft. Bowel sounds are normal. There is no tenderness.   Musculoskeletal: Normal range of motion. She exhibits no edema.        Lumbar back: She exhibits tenderness and spasm. She exhibits normal range of motion, no bony tenderness, no swelling and no edema.        Back:    Neurological: She is alert and oriented to person, place, and time. She has normal strength. No  cranial nerve deficit.   Skin: Skin is warm and dry. No erythema. No pallor.   Psychiatric: She has a normal mood and affect.         ED Course   Procedures  Labs Reviewed   POCT URINALYSIS W/O SCOPE - Abnormal; Notable for the following:        Result Value    Glucose, UA Negative (*)     Bilirubin, UA Negative (*)     Ketones, UA Negative (*)     Blood, UA Negative (*)     Protein, UA Negative (*)     Urobilinogen, UA 2.0 (*)     Nitrite, UA Negative (*)     Leukocytes, UA Negative (*)     All other components within normal limits   POCT URINALYSIS W/O SCOPE   POCT URINE PREGNANCY             Medical Decision Making:   ED Management:  Treated empirically for UTI due to urinary frequency. Urine culture sent.   Patient also with musculoskeletal back pain. 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 Pain is reproducible to palpation. No red flags. Will prescribe ibuprofen and robaxin. Patient will follow up with her PCP for re-evaluation.                       Clinical Impression:   The primary encounter diagnosis was Urinary frequency. A diagnosis of Acute bilateral low back pain without sciatica was also pertinent to this visit.                           Karina Payton MD  02/15/18 2041

## 2018-02-17 LAB — BACTERIA UR CULT: NORMAL

## 2019-11-18 ENCOUNTER — HOSPITAL ENCOUNTER (EMERGENCY)
Facility: HOSPITAL | Age: 49
Discharge: HOME | End: 2019-11-18
Attending: EMERGENCY MEDICINE
Payer: COMMERCIAL

## 2019-11-18 VITALS
BODY MASS INDEX: 28.51 KG/M2 | HEART RATE: 74 BPM | HEIGHT: 64 IN | RESPIRATION RATE: 18 BRPM | OXYGEN SATURATION: 98 % | TEMPERATURE: 99 F | DIASTOLIC BLOOD PRESSURE: 79 MMHG | WEIGHT: 167 LBS | SYSTOLIC BLOOD PRESSURE: 149 MMHG

## 2019-11-18 DIAGNOSIS — M79.10 MYALGIA: Primary | ICD-10-CM

## 2019-11-18 LAB
B-HCG UR QL: NEGATIVE
BILIRUBIN, POC UA: ABNORMAL
BLOOD, POC UA: NEGATIVE
CLARITY, POC UA: CLEAR
COLOR, POC UA: YELLOW
CTP QC/QA: YES
CTP QC/QA: YES
GLUCOSE, POC UA: NEGATIVE
KETONES, POC UA: NEGATIVE
LEUKOCYTE EST, POC UA: NEGATIVE
NITRITE, POC UA: NEGATIVE
PH UR STRIP: 5.5 [PH]
POC MOLECULAR INFLUENZA A AGN: NEGATIVE
POC MOLECULAR INFLUENZA B AGN: NEGATIVE
PROTEIN, POC UA: ABNORMAL
SPECIFIC GRAVITY, POC UA: >=1.03
UROBILINOGEN, POC UA: 1 E.U./DL

## 2019-11-18 PROCEDURE — 87804 INFLUENZA ASSAY W/OPTIC: CPT | Mod: ER

## 2019-11-18 PROCEDURE — 96372 THER/PROPH/DIAG INJ SC/IM: CPT | Mod: ER

## 2019-11-18 PROCEDURE — 99284 EMERGENCY DEPT VISIT MOD MDM: CPT | Mod: 25,ER

## 2019-11-18 PROCEDURE — 81025 URINE PREGNANCY TEST: CPT | Mod: ER | Performed by: NURSE PRACTITIONER

## 2019-11-18 PROCEDURE — 87502 INFLUENZA DNA AMP PROBE: CPT | Mod: ER

## 2019-11-18 PROCEDURE — 25000003 PHARM REV CODE 250: Mod: ER | Performed by: NURSE PRACTITIONER

## 2019-11-18 PROCEDURE — 81003 URINALYSIS AUTO W/O SCOPE: CPT | Mod: ER

## 2019-11-18 PROCEDURE — 63600175 PHARM REV CODE 636 W HCPCS: Mod: ER | Performed by: NURSE PRACTITIONER

## 2019-11-18 RX ORDER — NAPROXEN 250 MG/1
250 TABLET ORAL
Status: COMPLETED | OUTPATIENT
Start: 2019-11-18 | End: 2019-11-18

## 2019-11-18 RX ORDER — ORPHENADRINE CITRATE 30 MG/ML
30 INJECTION INTRAMUSCULAR; INTRAVENOUS
Status: COMPLETED | OUTPATIENT
Start: 2019-11-18 | End: 2019-11-18

## 2019-11-18 RX ADMIN — ORPHENADRINE CITRATE 30 MG: 30 INJECTION INTRAMUSCULAR; INTRAVENOUS at 07:11

## 2019-11-18 RX ADMIN — NAPROXEN 250 MG: 250 TABLET ORAL at 06:11

## 2019-11-19 NOTE — ED PROVIDER NOTES
Encounter Date: 11/18/2019       History     Chief Complaint   Patient presents with    Generalized Body Aches     pt reports she has had body aches with intermittent coughing x 3 days. reports she has been taking tylenol but denies relief    Urinary Frequency     x 3 days. denies vaginal itching, discharge, bleeding or any other symptoms     HPI   Patient is a 49-year-old female who reports generalized body aches and urinary frequency for the last 3 days.  She reports she has use no medications or treatments for these issues.  Denies fever, chills, cough, runny nose, back pain in all other complaints.    Review of patient's allergies indicates:  No Known Allergies  Past Medical History:   Diagnosis Date    Acid reflux     Allergy     Anemia     Hypertension      Past Surgical History:   Procedure Laterality Date    TUBAL LIGATION       Family History   Problem Relation Age of Onset    Hypertension Mother     Hypertension Sister     Cirrhosis Father     Alcohol abuse Father      Social History     Tobacco Use    Smoking status: Former Smoker    Smokeless tobacco: Never Used   Substance Use Topics    Alcohol use: Yes     Alcohol/week: 0.0 standard drinks     Comment: socially    Drug use: No     Review of Systems   Constitutional: Negative for chills, fatigue and fever.   HENT: Negative for congestion, ear discharge, ear pain, postnasal drip, rhinorrhea, sinus pressure, sneezing, sore throat and voice change.    Eyes: Negative for discharge and itching.   Respiratory: Negative for cough, shortness of breath and wheezing.    Cardiovascular: Negative for chest pain, palpitations and leg swelling.   Gastrointestinal: Negative for abdominal pain, constipation, diarrhea, nausea and vomiting.   Endocrine: Negative for polydipsia, polyphagia and polyuria.   Genitourinary: Positive for frequency. Negative for dysuria, hematuria, urgency, vaginal bleeding, vaginal discharge and vaginal pain.   Musculoskeletal:  Positive for myalgias. Negative for arthralgias.   Skin: Negative for rash and wound.   Neurological: Negative for dizziness, seizures, syncope, weakness and numbness.   Hematological: Negative for adenopathy. Does not bruise/bleed easily.   Psychiatric/Behavioral: Negative for self-injury and suicidal ideas. The patient is not nervous/anxious.        Physical Exam     Initial Vitals [11/18/19 1740]   BP Pulse Resp Temp SpO2   (!) 155/84 76 18 98.8 °F (37.1 °C) 98 %      MAP       --         Physical Exam    Nursing note and vitals reviewed.  Constitutional: She appears well-developed and well-nourished.   HENT:   Head: Normocephalic and atraumatic.   Right Ear: External ear normal.   Left Ear: External ear normal.   Nose: Nose normal. No epistaxis.   Mouth/Throat: Oropharynx is clear and moist.   Eyes: Conjunctivae and EOM are normal. Pupils are equal, round, and reactive to light. Right eye exhibits no discharge. Left eye exhibits no discharge.   Neck: Normal range of motion.   Cardiovascular: Regular rhythm, S1 normal, S2 normal and normal heart sounds. Exam reveals no gallop.    No murmur heard.  Pulmonary/Chest: Effort normal and breath sounds normal. No respiratory distress. She has no decreased breath sounds. She has no wheezes. She has no rhonchi. She has no rales.   Abdominal: Soft. Normal appearance and bowel sounds are normal. She exhibits no distension. There is no tenderness.   Musculoskeletal: Normal range of motion.   Neurological: She is alert and oriented to person, place, and time.   Skin: Skin is dry. Capillary refill takes less than 2 seconds.         ED Course   Procedures  Labs Reviewed   POCT URINALYSIS W/O SCOPE - Abnormal; Notable for the following components:       Result Value    Bilirubin, UA 1+ (*)     Spec Grav UA >=1.030 (*)     Protein, UA 1+ (*)     All other components within normal limits   POCT URINE PREGNANCY   POCT INFLUENZA A/B MOLECULAR   POCT URINALYSIS W/O SCOPE           Imaging Results    None          Medical Decision Making:   Initial Assessment:   49-year-old female who reports generalized body aches and urinary frequency without any other symptoms. Physical assessment was without abnormality.  Differential Diagnosis:   UTI, STD, influenza, pneumonia, viral syndrome  ED Management:  Initial orders included urinalysis, UPT, influenza                   ED Course as of Nov 18 2334   Mon Nov 18, 2019   1846 Preg Test, Ur: Negative [VC]   1848 POC Molecular Influenza A Ag: Negative [VC]   1848 POC Molecular Influenza B Ag: Negative [VC]   1848 UA is negative for infection, no nitrites, leukocytes, blood, or protein present.     POCT URINALYSIS W/O SCOPE(!) [VC]      ED Course User Index  [VC] German Wolff DNP     Patient was given Naprosyn 250 mg and Norflex 30 mg IM for relief of her discomfort.  She is discharged home in good condition to follow up with her primary care provider.  She should return for any worsening or changes in condition. Tylenol ibuprofen is advised.           Clinical Impression:       ICD-10-CM ICD-9-CM   1. Myalgia M79.10 729.1         Disposition:   Disposition: Discharged  Condition: Stable                     German Wolff DNP  11/18/19 1522

## 2019-11-19 NOTE — ED NOTES
Two patient identifiers have been checked and are correct.      Appearance: Pt awake, alert & oriented to person, place & time. Pt in no acute distress at present time. Pt is clean and well groomed with clothes appropriately fastened.   Skin: Skin warm, dry & intact. Color consistent with ethnicity. Mucous membranes moist. No breakdown or brusing noted.   Musculoskeletal: Patient moving all extremities well, no obvious swelling or deformities noted.   Respiratory: Respirations spontaneous, even, and non-labored. Visible chest rise noted. Airway is open and patent. No accessory muscle use noted.   Neurologic: Sensation is intact. Speech is clear and appropriate. Eyes open spontaneously, behavior appropriate to situation, follows commands, facial expression symmetrical, bilateral hand grasp equal and even, purposeful motor response noted.  Cardiac: All peripheral pulses present. No Bilateral lower extremity edema. Cap refill is <3 seconds.  Abdomen: Abdomen soft, non-tender to palpation.   : Pt reports no dysuria or hematuria.     Pt reports generalized body aches 8/10 x 3 days. Denies fever at home. Reports mild nonproductive cough. Family at bedside.

## 2019-12-01 ENCOUNTER — HOSPITAL ENCOUNTER (EMERGENCY)
Facility: HOSPITAL | Age: 49
Discharge: HOME | End: 2019-12-01
Attending: EMERGENCY MEDICINE
Payer: COMMERCIAL

## 2019-12-01 VITALS
DIASTOLIC BLOOD PRESSURE: 69 MMHG | RESPIRATION RATE: 21 BRPM | WEIGHT: 167 LBS | SYSTOLIC BLOOD PRESSURE: 117 MMHG | HEIGHT: 64 IN | HEART RATE: 82 BPM | TEMPERATURE: 98 F | OXYGEN SATURATION: 100 % | BODY MASS INDEX: 28.51 KG/M2

## 2019-12-01 DIAGNOSIS — R42 EPISODE OF DIZZINESS: Primary | ICD-10-CM

## 2019-12-01 LAB
ANION GAP SERPL CALC-SCNC: 15 MMOL/L (ref 8–16)
BUN SERPL-MCNC: 9 MG/DL (ref 6–30)
CHLORIDE SERPL-SCNC: 104 MMOL/L (ref 95–110)
CREAT SERPL-MCNC: 0.7 MG/DL (ref 0.5–1.4)
GLUCOSE SERPL-MCNC: 104 MG/DL (ref 70–110)
HCT VFR BLD CALC: 36 %PCV (ref 36–54)
POC IONIZED CALCIUM: 1.21 MMOL/L (ref 1.06–1.42)
POC TCO2 (MEASURED): 27 MMOL/L (ref 23–29)
POTASSIUM BLD-SCNC: 3.4 MMOL/L (ref 3.5–5.1)
SAMPLE: ABNORMAL
SODIUM BLD-SCNC: 143 MMOL/L (ref 136–145)

## 2019-12-01 PROCEDURE — 99900035 HC TECH TIME PER 15 MIN (STAT)

## 2019-12-01 PROCEDURE — 99283 EMERGENCY DEPT VISIT LOW MDM: CPT

## 2019-12-01 PROCEDURE — 84295 ASSAY OF SERUM SODIUM: CPT

## 2019-12-01 PROCEDURE — 82565 ASSAY OF CREATININE: CPT

## 2019-12-01 PROCEDURE — 84132 ASSAY OF SERUM POTASSIUM: CPT

## 2019-12-01 PROCEDURE — 85014 HEMATOCRIT: CPT

## 2019-12-01 PROCEDURE — 82330 ASSAY OF CALCIUM: CPT

## 2019-12-01 RX ORDER — LOSARTAN POTASSIUM 50 MG/1
50 TABLET ORAL DAILY
COMMUNITY
End: 2023-01-15 | Stop reason: CLARIF

## 2019-12-01 NOTE — ED TRIAGE NOTES
Pt arrives to er via ems on stretcher from home pt aaox4 no distress. Pt  present. Pt states she woke up hot, increase breathing, lightheadness, and shaking Hx htn; No hx of seizues, sob,  pain, dysuria, Shortness of Breath (EMS reports pt woke up out of sleep with shortness of breath and dizziness. denies c/o pain at this time.

## 2019-12-01 NOTE — ED PROVIDER NOTES
"Encounter Date: 12/1/2019    SCRIBE #1 NOTE: I, Javed Arguello , am scribing for, and in the presence of,  Jeremias Munroe MD. I have scribed the following portions of the note - Other sections scribed: HPI/ROS.       History     Chief Complaint   Patient presents with    Shortness of Breath     EMS reports pt woke up out of sleep with shortness of breath and dizziness. denies c/o pain at this time.      This 49 y.o. female with a medical history of HTN presents to the ED via EMS accompanied by  for an emergent evaluation of SOB.  reports as he was coming back to the [facility] from the bathroom this AM, pt rolled over and began "shaking" as if she was having a seizure.  reports he touched her to awaken her when she jumped up and was feeling SOB.  thought she was having a panic attack in her sleep. Pt states she was feeling "hot" that the time, noting she had to get in front of the fan. Pt states she just feels "hot" right now. SOB has now resolved. Pt reports feeling light-headed when she got up from the EMS stretcher. No hx of seizures. No known allergies to medications. No recent smoking, ETOH, or IV drug use. No pertinent PSHx. Otherwise, pt denies fever, chills, diaphoresis, headache, ear pain, sore throat, chest pain, cough, n/v, dysuria, arm/leg pain, rash, and any other associated symptoms.    The history is provided by the patient and the spouse. No  was used.     Review of patient's allergies indicates:  No Known Allergies  Past Medical History:   Diagnosis Date    Acid reflux     Allergy     Anemia     Hypertension      Past Surgical History:   Procedure Laterality Date    TUBAL LIGATION       Family History   Problem Relation Age of Onset    Hypertension Mother     Hypertension Sister     Cirrhosis Father     Alcohol abuse Father      Social History     Tobacco Use    Smoking status: Former Smoker    Smokeless tobacco: Never Used   Substance " Use Topics    Alcohol use: Not Currently     Alcohol/week: 0.0 standard drinks     Comment: socially    Drug use: No     Review of Systems   Constitutional: Negative for chills, diaphoresis and fever.   HENT: Negative for congestion and sore throat.    Eyes: Negative for visual disturbance.   Respiratory: Positive for shortness of breath (now resolved). Negative for cough.    Cardiovascular: Negative for chest pain.   Gastrointestinal: Negative for abdominal pain, diarrhea, nausea and vomiting.   Genitourinary: Negative for dysuria.   Musculoskeletal: Negative for back pain, myalgias and neck stiffness.   Skin: Negative for rash.   Neurological: Positive for light-headedness. Negative for headaches.       Physical Exam     Initial Vitals [12/01/19 0849]   BP Pulse Resp Temp SpO2   115/74 89 16 98.5 °F (36.9 °C) 98 %      MAP       --         Physical Exam  The patient was examined specifically for the following:   General:No significant distress, Good color, Warm and dry. Head and neck:Scalp atraumatic, Neck supple. Neurological:Appropriate conversation, Gross motor deficits. Eyes:Conjugate gaze, Clear corneas. ENT: No epistaxis. Cardiac: Regular rate and rhythm, Grossly normal heart tones. Pulmonary: Wheezing, Rales. Gastrointestinal: Abdominal tenderness, Abdominal distention. Musculoskeletal: Extremity deformity, Apparent pain with range of motion of the joints. Skin: Rash.   The findings on examination were normal.  Finger-to-nose heel-to-shin and gait are normal. Vital signs are stable. Mental status examination, cranial nerves, motor and sensory examination grossly unremarkable. The abdomen is nontender the lungs are clear.  The heart tones are normal.  ED Course   Procedures  Labs Reviewed   ISTAT PROCEDURE - Abnormal; Notable for the following components:       Result Value    POC Potassium 3.4 (*)     All other components within normal limits   ISTAT CHEM8          Imaging Results    None       Medical  decision making:  This patient presents to the emergency room after being startled from sleep.  She jumped out of bed breathing fast.  She experienced some dizziness.  She was shaking all over according to her .  There was no loss of consciousness or postictal.  The patient was dizzy she is not dizzy now.  Finger-to-nose heel-to-shin and gait are normal. I believe that the patient was dreaming and had a moment being to wean wakefulness and sleep that was disturbing for her.  She is now recovered to her normal self.  Her examination is normal. I will discharge to outpatient evaluation and treatment.  I stat Chem 8 is normal.                 Scribe Attestation:   Scribe #1: I performed the above scribed service and the documentation accurately describes the services I performed. I attest to the accuracy of the note.                      I personally performed the services described in this documentation.  All medical record  entries made by the scribe are at my direction and in my presence.  Signed, Dr. Munroe    Clinical Impression:       ICD-10-CM ICD-9-CM   1. Episode of dizziness R42 780.4                             Jeremias Munroe MD  12/01/19 0924

## 2019-12-01 NOTE — DISCHARGE INSTRUCTIONS
Please return immediately if youGet worse or if new problems develop.  Please follow-up with your primary care doctor this week.  Rest.

## 2019-12-09 ENCOUNTER — HOSPITAL ENCOUNTER (EMERGENCY)
Facility: HOSPITAL | Age: 49
Discharge: HOME | End: 2019-12-09
Attending: EMERGENCY MEDICINE
Payer: COMMERCIAL

## 2019-12-09 VITALS
BODY MASS INDEX: 28.17 KG/M2 | SYSTOLIC BLOOD PRESSURE: 140 MMHG | OXYGEN SATURATION: 100 % | HEIGHT: 64 IN | RESPIRATION RATE: 18 BRPM | HEART RATE: 80 BPM | WEIGHT: 165 LBS | DIASTOLIC BLOOD PRESSURE: 90 MMHG | TEMPERATURE: 99 F

## 2019-12-09 DIAGNOSIS — R09.81 SINUS CONGESTION: Primary | ICD-10-CM

## 2019-12-09 LAB
B-HCG UR QL: NEGATIVE
CTP QC/QA: YES

## 2019-12-09 PROCEDURE — 81025 URINE PREGNANCY TEST: CPT | Mod: ER | Performed by: NURSE PRACTITIONER

## 2019-12-09 PROCEDURE — 96372 THER/PROPH/DIAG INJ SC/IM: CPT | Mod: ER

## 2019-12-09 PROCEDURE — 99284 EMERGENCY DEPT VISIT MOD MDM: CPT | Mod: 25,ER

## 2019-12-09 PROCEDURE — 63600175 PHARM REV CODE 636 W HCPCS: Mod: ER | Performed by: NURSE PRACTITIONER

## 2019-12-09 RX ORDER — DEXAMETHASONE SODIUM PHOSPHATE 4 MG/ML
10 INJECTION, SOLUTION INTRA-ARTICULAR; INTRALESIONAL; INTRAMUSCULAR; INTRAVENOUS; SOFT TISSUE
Status: COMPLETED | OUTPATIENT
Start: 2019-12-09 | End: 2019-12-09

## 2019-12-09 RX ADMIN — DEXAMETHASONE SODIUM PHOSPHATE 10 MG: 4 INJECTION, SOLUTION INTRA-ARTICULAR; INTRALESIONAL; INTRAMUSCULAR; INTRAVENOUS; SOFT TISSUE at 02:12

## 2019-12-09 NOTE — ED PROVIDER NOTES
Encounter Date: 12/9/2019    SCRIBE #1 NOTE: I, Elizabeth Calvert, am scribing for, and in the presence of,  BOYD Garcia. I have scribed the following portions of the note - Other sections scribed: HPI, ROS, PE.       History     Chief Complaint   Patient presents with    Sinusitis     pt report sinus pressure, congestion x2 days.      This is a nontoxic 49 year old female who complains of sinus pressure and congestion x 2 days.  Patient has been taking Zyrtec with no relief.    The history is provided by the patient. No  was used.   URI   Primary symptoms comment: sinus congestion . The current episode started two days ago. The problem has been gradually worsening.   Symptoms associated with the illness include sinus pressure and congestion.     Review of patient's allergies indicates:  No Known Allergies  Past Medical History:   Diagnosis Date    Acid reflux     Allergy     Anemia     Hypertension      Past Surgical History:   Procedure Laterality Date    TUBAL LIGATION       Family History   Problem Relation Age of Onset    Hypertension Mother     Hypertension Sister     Cirrhosis Father     Alcohol abuse Father      Social History     Tobacco Use    Smoking status: Former Smoker    Smokeless tobacco: Never Used   Substance Use Topics    Alcohol use: Not Currently     Alcohol/week: 0.0 standard drinks     Comment: socially    Drug use: No     Review of Systems   Constitutional: Negative.    HENT: Positive for congestion and sinus pressure.    Eyes: Negative.    Respiratory: Negative.  Negative for shortness of breath.    Cardiovascular: Negative.  Negative for chest pain.   Gastrointestinal: Negative.    Endocrine: Negative.    Genitourinary: Negative.    Musculoskeletal: Negative.    Skin: Negative.    Allergic/Immunologic: Negative.    Neurological: Negative.    Hematological: Negative.    Psychiatric/Behavioral: Negative.    All other systems reviewed and are  negative.      Physical Exam     Initial Vitals [12/09/19 1249]   BP Pulse Resp Temp SpO2   (!) 142/92 77 16 98.4 °F (36.9 °C) 100 %      MAP       --         Physical Exam    Nursing note and vitals reviewed.  Constitutional: She appears well-developed.   HENT:   Right Ear: External ear normal.   Left Ear: External ear normal.   Nose: Mucosal edema present.   Mouth/Throat: Oropharynx is clear and moist.   Eyes: Conjunctivae are normal.   Neck: Normal range of motion. Neck supple.   Cardiovascular: Normal rate, regular rhythm, S1 normal, S2 normal and normal heart sounds.   Pulmonary/Chest: Effort normal and breath sounds normal. She has no wheezes.   Abdominal: Soft.   Musculoskeletal: Normal range of motion. She exhibits no edema or tenderness.   Neurological: She is alert and oriented to person, place, and time.   Skin: Skin is warm and dry. No rash noted.   Psychiatric: She has a normal mood and affect. Her behavior is normal.         ED Course   Procedures  Labs Reviewed   POCT URINE PREGNANCY          Imaging Results    None          Medical Decision Making:   History:   Old Medical Records: I decided to obtain old medical records.  Initial Assessment:   This is a nontoxic 49 year old female who complains of sinus pressure and congestion x 2 days.  Patient has been taking Zyrtec with no relief.    Differential Diagnosis:   Allergic rhinitis, Acute sinusitis, Sinus congestion   Clinical Tests:   Lab Tests: Ordered and Reviewed  ED Management:  Decadron 10 mg IM.  Continue Zyrtec.  Follow-up with PCP in 2 days.  Return ED for worsening of symptoms.             Scribe Attestation:   Scribe #1: I performed the above scribed service and the documentation accurately describes the services I performed. I attest to the accuracy of the note.    This document was produced by a scribe under my direction and in my presence. I agree with the content of the note and have made any necessary edits.     Awa Moore  [facility]    12/09/2019 9:43 PM                      Clinical Impression:     1. Sinus congestion                                Awa Moore, [facility]  12/09/19 214

## 2021-02-10 ENCOUNTER — HOSPITAL ENCOUNTER (EMERGENCY)
Facility: HOSPITAL | Age: 51
Discharge: HOME | End: 2021-02-10
Attending: EMERGENCY MEDICINE
Payer: COMMERCIAL

## 2021-02-10 VITALS
HEART RATE: 70 BPM | RESPIRATION RATE: 20 BRPM | BODY MASS INDEX: 25.95 KG/M2 | DIASTOLIC BLOOD PRESSURE: 90 MMHG | SYSTOLIC BLOOD PRESSURE: 132 MMHG | OXYGEN SATURATION: 99 % | WEIGHT: 152 LBS | TEMPERATURE: 98 F | HEIGHT: 64 IN

## 2021-02-10 DIAGNOSIS — M54.50 LUMBAR PAIN: Primary | ICD-10-CM

## 2021-02-10 LAB
BILIRUBIN, POC UA: NEGATIVE
BLOOD, POC UA: ABNORMAL
CLARITY, POC UA: CLEAR
COLOR, POC UA: YELLOW
GLUCOSE, POC UA: NEGATIVE
KETONES, POC UA: NEGATIVE
LEUKOCYTE EST, POC UA: NEGATIVE
NITRITE, POC UA: NEGATIVE
PH UR STRIP: 7 [PH]
PROTEIN, POC UA: NEGATIVE
SPECIFIC GRAVITY, POC UA: 1.02
UROBILINOGEN, POC UA: 0.2 E.U./DL

## 2021-02-10 PROCEDURE — 99284 EMERGENCY DEPT VISIT MOD MDM: CPT | Mod: 25,ER

## 2021-02-10 PROCEDURE — 96372 THER/PROPH/DIAG INJ SC/IM: CPT | Mod: ER

## 2021-02-10 PROCEDURE — 87086 URINE CULTURE/COLONY COUNT: CPT

## 2021-02-10 PROCEDURE — 63600175 PHARM REV CODE 636 W HCPCS: Mod: ER | Performed by: NURSE PRACTITIONER

## 2021-02-10 PROCEDURE — 81003 URINALYSIS AUTO W/O SCOPE: CPT | Mod: ER

## 2021-02-10 RX ORDER — METHOCARBAMOL 500 MG/1
1000 TABLET, FILM COATED ORAL EVERY 6 HOURS PRN
Qty: 40 TABLET | Refills: 0 | Status: SHIPPED | OUTPATIENT
Start: 2021-02-10 | End: 2023-01-15 | Stop reason: CLARIF

## 2021-02-10 RX ORDER — KETOROLAC TROMETHAMINE 30 MG/ML
30 INJECTION, SOLUTION INTRAMUSCULAR; INTRAVENOUS
Status: COMPLETED | OUTPATIENT
Start: 2021-02-10 | End: 2021-02-10

## 2021-02-10 RX ORDER — DICLOFENAC SODIUM 50 MG/1
50 TABLET, DELAYED RELEASE ORAL 3 TIMES DAILY PRN
Qty: 30 TABLET | Refills: 0 | Status: SHIPPED | OUTPATIENT
Start: 2021-02-10 | End: 2023-01-15 | Stop reason: CLARIF

## 2021-02-10 RX ADMIN — KETOROLAC TROMETHAMINE 30 MG: 30 INJECTION, SOLUTION INTRAMUSCULAR at 11:02

## 2021-02-12 LAB — BACTERIA UR CULT: NORMAL

## 2022-04-17 ENCOUNTER — HOSPITAL ENCOUNTER (EMERGENCY)
Facility: HOSPITAL | Age: 52
Discharge: HOME | End: 2022-04-18
Attending: EMERGENCY MEDICINE

## 2022-04-17 DIAGNOSIS — J04.0 LARYNGITIS: Primary | ICD-10-CM

## 2022-04-17 DIAGNOSIS — J30.1 SEASONAL ALLERGIC RHINITIS DUE TO POLLEN: ICD-10-CM

## 2022-04-17 PROCEDURE — 99284 EMERGENCY DEPT VISIT MOD MDM: CPT | Mod: 25,ER

## 2022-04-18 VITALS
RESPIRATION RATE: 16 BRPM | DIASTOLIC BLOOD PRESSURE: 86 MMHG | HEIGHT: 64 IN | SYSTOLIC BLOOD PRESSURE: 134 MMHG | BODY MASS INDEX: 25.47 KG/M2 | HEART RATE: 63 BPM | OXYGEN SATURATION: 96 % | WEIGHT: 149.19 LBS | TEMPERATURE: 98 F

## 2022-04-18 PROCEDURE — 25000242 PHARM REV CODE 250 ALT 637 W/ HCPCS: Mod: ER | Performed by: EMERGENCY MEDICINE

## 2022-04-18 PROCEDURE — 63600175 PHARM REV CODE 636 W HCPCS: Mod: ER | Performed by: EMERGENCY MEDICINE

## 2022-04-18 PROCEDURE — 94640 AIRWAY INHALATION TREATMENT: CPT | Mod: ER

## 2022-04-18 RX ORDER — PREDNISONE 20 MG/1
40 TABLET ORAL DAILY
Qty: 10 TABLET | Refills: 0 | Status: SHIPPED | OUTPATIENT
Start: 2022-04-18 | End: 2022-04-23

## 2022-04-18 RX ORDER — ALBUTEROL SULFATE 90 UG/1
2 AEROSOL, METERED RESPIRATORY (INHALATION) EVERY 6 HOURS PRN
Qty: 6.7 G | Refills: 0 | OUTPATIENT
Start: 2022-04-18 | End: 2022-08-05

## 2022-04-18 RX ORDER — IPRATROPIUM BROMIDE AND ALBUTEROL SULFATE 2.5; .5 MG/3ML; MG/3ML
3 SOLUTION RESPIRATORY (INHALATION) ONCE
Status: COMPLETED | OUTPATIENT
Start: 2022-04-18 | End: 2022-04-18

## 2022-04-18 RX ORDER — PREDNISONE 20 MG/1
40 TABLET ORAL
Status: COMPLETED | OUTPATIENT
Start: 2022-04-18 | End: 2022-04-18

## 2022-04-18 RX ADMIN — PREDNISONE 40 MG: 20 TABLET ORAL at 12:04

## 2022-04-18 RX ADMIN — IPRATROPIUM BROMIDE AND ALBUTEROL SULFATE 3 ML: .5; 3 SOLUTION RESPIRATORY (INHALATION) at 12:04

## 2022-04-18 NOTE — ED PROVIDER NOTES
Encounter Date: 4/17/2022       History     Chief Complaint   Patient presents with    Cough    SINUS CONGESTION     PT C/O COUGH, SINUS CONGESTION AND LOSS OF VOICE FOR 3 DAYS     This patient presents emergency department complaints of cough congestion and loss of voice.  No fever reported.  Patient is constantly clearing her throat.    The history is provided by the patient.     Review of patient's allergies indicates:  No Known Allergies  Past Medical History:   Diagnosis Date    Acid reflux     Allergy     Anemia     Hypertension      Past Surgical History:   Procedure Laterality Date    TUBAL LIGATION       Family History   Problem Relation Age of Onset    Hypertension Mother     Hypertension Sister     Cirrhosis Father     Alcohol abuse Father      Social History     Tobacco Use    Smoking status: Former Smoker    Smokeless tobacco: Never Used   Substance Use Topics    Alcohol use: Not Currently     Alcohol/week: 0.0 standard drinks     Comment: socially    Drug use: No     Review of Systems   Constitutional: Negative.    HENT: Positive for congestion, postnasal drip and rhinorrhea.    Eyes: Negative.    Respiratory: Positive for cough.    Cardiovascular: Negative.    Gastrointestinal: Negative.    Endocrine: Negative.    Genitourinary: Negative.    Musculoskeletal: Negative.    Skin: Negative.    Allergic/Immunologic: Negative.    Neurological: Negative.    Hematological: Negative.    Psychiatric/Behavioral: Negative.    All other systems reviewed and are negative.      Physical Exam     Initial Vitals [04/17/22 2353]   BP Pulse Resp Temp SpO2   134/86 69 20 98 °F (36.7 °C) 97 %      MAP       --         Physical Exam    Nursing note and vitals reviewed.  Constitutional: Vital signs are normal. She appears well-developed. She is active and cooperative.   HENT:   Head: Normocephalic and atraumatic.   Right Ear: Tympanic membrane normal.   Left Ear: Tympanic membrane normal.   Nose: Mucosal  edema and rhinorrhea present.   Mouth/Throat: Uvula is midline, oropharynx is clear and moist and mucous membranes are normal.   Eyes: Conjunctivae, EOM and lids are normal. Pupils are equal, round, and reactive to light.   Neck: Trachea normal and phonation normal. Neck supple. No thyroid mass present. No stridor present.   Normal range of motion.   Full passive range of motion without pain.     Cardiovascular: Normal rate, regular rhythm, S1 normal, S2 normal, normal heart sounds, intact distal pulses and normal pulses.   Pulmonary/Chest: Effort normal and breath sounds normal.   Abdominal: Abdomen is soft and flat. Bowel sounds are normal. There is no abdominal tenderness.   Musculoskeletal:         General: Normal range of motion.      Cervical back: Full passive range of motion without pain, normal range of motion and neck supple.     Lymphadenopathy:     She has no axillary adenopathy.   Neurological: She is alert and oriented to person, place, and time.   Skin: Skin is warm, dry and intact.   Psychiatric: She has a normal mood and affect. Her speech is normal and behavior is normal. Judgment and thought content normal. Cognition and memory are normal.         ED Course   Procedures  Labs Reviewed - No data to display       Imaging Results    None          Medications   albuterol-ipratropium 2.5 mg-0.5 mg/3 mL nebulizer solution 3 mL (3 mLs Nebulization Given 4/18/22 0021)   predniSONE tablet 40 mg (40 mg Oral Given 4/18/22 0014)                          Clinical Impression:   Final diagnoses:  [J04.0] Laryngitis (Primary)  [J30.1] Seasonal allergic rhinitis due to pollen          ED Disposition Condition    Discharge Stable        ED Prescriptions     Medication Sig Dispense Start Date End Date Auth. Provider    albuterol (PROVENTIL/VENTOLIN HFA) 90 mcg/actuation inhaler Inhale 2 puffs into the lungs every 6 (six) hours as needed for Wheezing. Rescue 6.7 g 4/18/2022  Suhail Sellers MD    predniSONE  (DELTASONE) 20 MG tablet Take 2 tablets (40 mg total) by mouth once daily. for 5 days 10 tablet 4/18/2022 4/23/2022 Suhail Sellers MD        Follow-up Information     Follow up With Specialties Details Why Contact Info    Hillary Hill MD Family Medicine In 1 week  [address] [facility] [address]  [phone]             Suhail Sellers MD  04/18/22 6501

## 2022-08-05 ENCOUNTER — HOSPITAL ENCOUNTER (EMERGENCY)
Facility: HOSPITAL | Age: 52
Discharge: HOME | End: 2022-08-05
Attending: EMERGENCY MEDICINE

## 2022-08-05 VITALS
SYSTOLIC BLOOD PRESSURE: 142 MMHG | OXYGEN SATURATION: 99 % | BODY MASS INDEX: 26.12 KG/M2 | TEMPERATURE: 99 F | WEIGHT: 153 LBS | DIASTOLIC BLOOD PRESSURE: 82 MMHG | HEART RATE: 80 BPM | HEIGHT: 64 IN | RESPIRATION RATE: 16 BRPM

## 2022-08-05 DIAGNOSIS — U07.1 COVID-19: Primary | ICD-10-CM

## 2022-08-05 LAB
CTP QC/QA: YES
INFLUENZA A ANTIGEN, POC: NEGATIVE
INFLUENZA B ANTIGEN, POC: NEGATIVE
SARS-COV-2 RDRP RESP QL NAA+PROBE: POSITIVE

## 2022-08-05 PROCEDURE — 87502 INFLUENZA DNA AMP PROBE: CPT | Mod: ER

## 2022-08-05 PROCEDURE — 99284 EMERGENCY DEPT VISIT MOD MDM: CPT | Mod: 25,ER

## 2022-08-05 PROCEDURE — U0002 COVID-19 LAB TEST NON-CDC: HCPCS | Mod: ER | Performed by: NURSE PRACTITIONER

## 2022-08-05 RX ORDER — ALBUTEROL SULFATE 90 UG/1
1-2 AEROSOL, METERED RESPIRATORY (INHALATION) EVERY 6 HOURS PRN
Qty: 8 G | Refills: 0 | OUTPATIENT
Start: 2022-08-05 | End: 2023-07-31

## 2022-08-05 RX ORDER — PROMETHAZINE HYDROCHLORIDE AND DEXTROMETHORPHAN HYDROBROMIDE 6.25; 15 MG/5ML; MG/5ML
5 SYRUP ORAL NIGHTLY PRN
Qty: 118 ML | Refills: 0 | Status: SHIPPED | OUTPATIENT
Start: 2022-08-05 | End: 2022-08-15

## 2022-08-05 RX ORDER — ACETAMINOPHEN 500 MG
1000 TABLET ORAL EVERY 8 HOURS PRN
Qty: 20 TABLET | Refills: 0 | Status: SHIPPED | OUTPATIENT
Start: 2022-08-05 | End: 2023-01-15 | Stop reason: CLARIF

## 2022-08-05 NOTE — FIRST PROVIDER EVALUATION
Emergency Department TeleTriage Encounter Note      CHIEF COMPLAINT    Chief Complaint   Patient presents with    COVID-19 Concerns     Complains of frontal headache, nasal congestion, and cough x3 days.        VITAL SIGNS   Initial Vitals [08/05/22 1207]   BP Pulse Resp Temp SpO2   (!) 142/82 80 16 98.7 °F (37.1 °C) 99 %      MAP       --            ALLERGIES    Review of patient's allergies indicates:  No Known Allergies    PROVIDER TRIAGE NOTE  This is a teletriage evaluation of a 51 y.o. female presenting to the ED complaining of nasal congestion, cough, and headache for three days. Denies fever and SOB.     Well-appearing, no distress.     Initial orders will be placed and care will be transferred to an alternate provider when patient is roomed for a full evaluation. Any additional orders and the final disposition will be determined by that provider.           ORDERS  Labs Reviewed   SARS-COV-2 RDRP GENE   POCT INFLUENZA A/B MOLECULAR       ED Orders (720h ago, onward)    Start Ordered     Status Ordering Provider    08/05/22 1240 08/05/22 1239  Airborne and Contact and Droplet Isolation Status  Continuous         Ordered AVA FISHER.    08/05/22 1226 08/05/22 1225  POCT COVID-19 Rapid Screening  Once         Ordered BIANKA CRUZ    08/05/22 1226 08/05/22 1225  POCT Influenza A/B Molecular  Once         Ordered BIANKA CRUZ            Virtual Visit Note: The provider triage portion of this emergency department evaluation and documentation was performed via [employer], a HIPAA-compliant telemedicine application, in concert with a tele-presenter in the room. A face to face patient evaluation with one of my colleagues will occur once the patient is placed in an emergency department room.      DISCLAIMER: This note was prepared with [employer] voice recognition transcription software. Garbled syntax, mangled pronouns, and other bizarre constructions may be attributed to that software  system.

## 2022-08-05 NOTE — ED PROVIDER NOTES
Encounter Date: 8/5/2022       History     Chief Complaint   Patient presents with    COVID-19 Concerns     Complains of frontal headache, nasal congestion, and cough x3 days.      CC: Cough    HPI:  51-year-old female with hypertension presenting to the ED with 3 day history of frontal headache, nasal congestion, rhinorrhea, cough.  She has been taking Mucinex with no improvement in symptoms.  Denies any known sick contacts.  Denies fever, chills, chest pain, shortness of breath.    The history is provided by the patient. No  was used.     Review of patient's allergies indicates:  No Known Allergies  Past Medical History:   Diagnosis Date    Acid reflux     Allergy     Anemia     Hypertension      Past Surgical History:   Procedure Laterality Date    TUBAL LIGATION       Family History   Problem Relation Age of Onset    Hypertension Mother     Hypertension Sister     Cirrhosis Father     Alcohol abuse Father      Social History     Tobacco Use    Smoking status: Former Smoker    Smokeless tobacco: Never Used   Substance Use Topics    Alcohol use: Not Currently     Alcohol/week: 0.0 standard drinks     Comment: socially    Drug use: No     Review of Systems   Constitutional: Negative for chills and fever.   HENT: Positive for congestion and rhinorrhea. Negative for sore throat.    Respiratory: Positive for cough. Negative for shortness of breath.    Cardiovascular: Negative for chest pain.   Gastrointestinal: Negative for abdominal pain, nausea and vomiting.   Genitourinary: Negative for dysuria.   Musculoskeletal: Negative for back pain.   Skin: Negative for rash.   Neurological: Negative for weakness.   Hematological: Does not bruise/bleed easily.       Physical Exam     Initial Vitals [08/05/22 1207]   BP Pulse Resp Temp SpO2   (!) 142/82 80 16 98.7 °F (37.1 °C) 99 %      MAP       --         Physical Exam    Constitutional: She appears well-developed and well-nourished. She is  not diaphoretic. No distress.   Pleasant.  Well-appearing.   HENT:   Head: Normocephalic and atraumatic.   Right Ear: External ear normal.   Left Ear: External ear normal.   Neck:   Normal range of motion.  Pulmonary/Chest: No respiratory distress.   Speaking in full and clear sentences without dyspnea or pause.  No tachypnea.   Musculoskeletal:         General: Normal range of motion.      Cervical back: Normal range of motion.     Neurological: She is alert and oriented to person, place, and time.   Skin: Skin is warm and dry.   Psychiatric: She has a normal mood and affect. Her behavior is normal.         ED Course   Procedures  Labs Reviewed   SARS-COV-2 RDRP GENE - Abnormal; Notable for the following components:       Result Value    POC Rapid COVID Positive (*)     All other components within normal limits    Narrative:     This test utilizes isothermal nucleic acid amplification   technology to detect the SARS-CoV-2 RdRp nucleic acid segment.   The analytical sensitivity (limit of detection) is 125 genome   equivalents/mL.   A POSITIVE result implies infection with the SARS-CoV-2 virus;   the patient is presumed to be contagious.     A NEGATIVE result means that SARS-CoV-2 nucleic acids are not   present above the limit of detection. A NEGATIVE result should be   treated as presumptive. It does not rule out the possibility of   COVID-19 and should not be the sole basis for treatment decisions.   If COVID-19 is strongly suspected based on clinical and exposure   history, re-testing using an alternate molecular assay should be   considered.   This test is only for use under the Food and Drug   Administration s Emergency Use Authorization (EUA).   Commercial kits are provided by [employer].   Performance characteristics of the EUA have been independently   verified by [facility] Department of   Pathology and Laboratory Medicine.    _________________________________________________________________   The authorized Fact Sheet for Healthcare Providers and the authorized Fact   Sheet for Patients of the ID NOW COVID-19 are available on the FDA   website:     https://www.fda.gov/media/491934/download  https://www.fda.gov/media/306790/download          POCT INFLUENZA A/B MOLECULAR   POCT RAPID INFLUENZA A/B          Imaging Results    None          Medications - No data to display  Medical Decision Making:   ED Management:  51-year-old female presenting to the ED with URI symptoms x3 days.  COVID is positive.  There is no hypoxia or evidence of respiratory distress.  She is saturating 99% on room air.  She has a history of hypertension which is a risk factor for severe disease.  I offered treatment with the experimental drug Paxlovid.  Patient would like a prescription.  She was also given a ED fact sheet as well as list of contraindicated drugs.  I will also discharge her home with symptomatic relief.  Strict return precautions discussed with patient verbalized understanding.                      Clinical Impression:   Final diagnoses:  [U07.1] COVID-19 (Primary)          ED Disposition Condition    Discharge Stable        ED Prescriptions     Medication Sig Dispense Start Date End Date Auth. Provider    nirmatrelvir-ritonavir 300 mg (150 mg x 2)-100 mg copackaged tablets (EUA) Take 3 tablets by mouth 2 (two) times daily for 5 days. Each dose contains 2 nirmatrelvir (pink tablets) and 1 ritonavir (white tablet). Take all 3 tablets together 30 tablet 8/5/2022 8/10/2022 Myrna Cardona NP    promethazine-dextromethorphan (PROMETHAZINE-DM) 6.25-15 mg/5 mL Syrp Take 5 mLs by mouth nightly as needed (cough). 118 mL 8/5/2022 8/15/2022 Myrna Cardona NP    albuterol (PROVENTIL/VENTOLIN HFA) 90 mcg/actuation inhaler Inhale 1-2 puffs into the lungs every 6 (six) hours as needed for Wheezing. Rescue 8 g 8/5/2022 8/5/2023 Myrna Cardona, NP     acetaminophen (TYLENOL) 500 MG tablet Take 2 tablets (1,000 mg total) by mouth every 8 (eight) hours as needed for Pain or Temperature greater than (100.4). 20 tablet 8/5/2022  Myrna Cardona NP        Follow-up Information     Follow up With Specialties Details Why Contact Info    Hillary Hill MD Family Medicine Schedule an appointment as soon as possible for a visit  For follow-up [address] [facility] [address]  [phone]      [facility] ED Emergency Medicine Go to  If symptoms worsen [address] [facility] [address] [phone]           Myrna Cardona NP  08/05/22 0082

## 2022-08-05 NOTE — Clinical Note
"Herminia"Gordy Boyle was seen and treated in our emergency department on 8/5/2022.     COVID-19 is present in our communities across the state. There is limited testing for COVID at this time, so not all patients can be tested. In this situation, your employee meets the following criteria:    Herminia Boyle has met the criteria for COVID-19 testing and has a POSITIVE result. She can return to work once they are asymptomatic for 24 hours without the use of fever reducing medications AND at least five days from the first positive result. A mask is recommended for 5 days post quarantine.     If you have any questions or concerns, or if I can be of further assistance, please do not hesitate to contact me.    Sincerely,             Myrna Cardona NP"

## 2022-08-05 NOTE — DISCHARGE INSTRUCTIONS
Thank you for coming to our Emergency Department today. It is important to remember that some problems or medical conditions are difficult to diagnose and may not be found during your Emergency Department visit.     Be sure to follow up with your primary care doctor and review all labs/imaging/tests that were performed during your ER visit with them. Some labs/tests may be outside of the normal range and require non-emergent follow-up and further investigation to help diagnose/exclude/prevent complications or other potentially serious medical conditions that were not addressed during your ER visit.    If you do not have a primary care doctor, you may contact the one listed on your discharge paperwork or you may also call the [facility] Appointment Desk at [phone] to schedule an appointment and establish care with one. It is important to your health that you have a primary care doctor.    Please take all medications as directed. All medications may potentially have side-effects and it is impossible to predict which medications may give you side-effects or what side-effects (if any) they will give you.. If you feel that you are having a negative effect or side-effect of any medication you should immediately stop taking them and seek medical attention. If you feel that you are having a life-threatening reaction call 911.    Return to the ER with any questions/concerns, new/concerning symptoms, worsening or failure to improve.     Do not drive, swim, climb to height, take a bath, operate heavy machinery, drink alcohol or take potentially sedating medications, sign any legal documents or make any important decisions for 24 hours if you have received any pain medications, sedatives or mood altering drugs during your ER visit or within 24 hours of taking them if they have been prescribed to you.     You can find additional resources for Dentists, hearing aids, durable medical equipment, low cost pharmacies and  other resources at [email or web address].org    BELOW THIS LINE ONLY APPLIES IF YOU HAVE A COVID TEST PENDING OR IF YOU HAVE BEEN DIAGNOSED WITH COVID:  Please access [facility] to review the results of your test. Until the results of your COVID test return, you should isolate yourself so as not to potentially spread illness to others.   If your COVID test returns positive, you should isolate yourself so as not to spread illness to others. After five full days, if you are feeling better and you have not had fever for 24 hours, you can return to your typical daily activities, but you must wear a mask around others for an additional 5 days.   If your COVID test returns negative and you are either unvaccinated or more than six months out from your two-dose vaccine and are not yet boosted, you should still quarantine for 5 full days followed by strict mask use for an additional 5 full days.   If your COVID test returns negative and you have received your 2-dose initial vaccine as well as a booster, you should continue strict mask use for 10 full days after the exposure.  For all those exposed, best practice includes a test at day 5 after the exposure. This can be a home test or a test through one of the many testing centers throughout our community.   Masking is always advised to limit the spread of COVID. Cdc.gov is an excellent site to obtain the latest up to date recommendations regarding COVID and COVID testing.     CDC Testing and Quarantine Guidelines for patients with exposure to a known-positive COVID-19 person:  A close exposure is defined as anyone who has had an exposure (masked or unmasked) to a known COVID -19 positive person within 6 feet of someone for a cumulative total of 15 minutes or more over a 24-hour period.   Vaccinated and/or if you recently had a positive covid test within 90 days do NOT need to quarantine after contact with someone who had COVID-19 unless you develop symptoms.   Fully vaccinated  people who have not had a positive test within 90 days, should get tested 3-5 days after their exposure, even if they don't have symptoms and wear a mask indoors in public for 14 days following exposure or until their test result is negative.      Unvaccinated and/or NOT had a positive test within 90 days and meet close exposure  You are required by CDC guidelines to quarantine for at least 5 days from time of exposure followed by 5 days of strict masking. It is recommended, but not required to test after 5 days, unless you develop symptoms, in which case you should test at that time.  If you get tested after 5 days and your test is positive, your 5 day period of isolation starts the day of the positive test.    If your exposure does not meet the above definition, you can return to your normal daily activities to include social distancing, wearing a mask and frequent handwashing.      Here is a link to guidance from the CDC:  https://www.cdc.gov/media/releases/2021/s1227-isolation-quarantine-guidance.html      [place] Dept Of Health Testing Sites:  https://ldh.la.gov/page/3934      [facility] website with testing locations and guidance:  [email or web address]

## 2023-01-15 ENCOUNTER — HOSPITAL ENCOUNTER (EMERGENCY)
Facility: HOSPITAL | Age: 53
Discharge: HOME | End: 2023-01-15
Attending: EMERGENCY MEDICINE
Payer: COMMERCIAL

## 2023-01-15 VITALS
RESPIRATION RATE: 20 BRPM | BODY MASS INDEX: 25.61 KG/M2 | HEART RATE: 70 BPM | OXYGEN SATURATION: 99 % | DIASTOLIC BLOOD PRESSURE: 70 MMHG | SYSTOLIC BLOOD PRESSURE: 120 MMHG | WEIGHT: 150 LBS | HEIGHT: 64 IN | TEMPERATURE: 98 F

## 2023-01-15 DIAGNOSIS — M79.602 LEFT ARM PAIN: Primary | ICD-10-CM

## 2023-01-15 PROCEDURE — 99283 EMERGENCY DEPT VISIT LOW MDM: CPT | Mod: ER

## 2023-01-15 RX ORDER — IBUPROFEN 600 MG/1
600 TABLET ORAL EVERY 6 HOURS PRN
Qty: 20 TABLET | Refills: 0 | OUTPATIENT
Start: 2023-01-15 | End: 2023-07-31

## 2023-01-15 NOTE — ED PROVIDER NOTES
Encounter Date: 1/15/2023    SCRIBE #1 NOTE: I, Sarah Davies, am scribing for, and in the presence of,  Siria Lance MD. I have scribed the following portions of the note - Other sections scribed: HPI, ROS, PE.     History     Chief Complaint   Patient presents with    Shoulder Pain     Pt presents to ed with c/o left shoulder pain that radiates down her left arm x 2 days. Pt has tenderness to left shoulder when lightly palpated. States has been lifting heavy objects at work. States took tylenol 1000mg pta. Left radial pulse strong, regular and easily palpated.      Herminia Boyle is a 52 y.o. female, with a past medical history of HTN, who presents to the ED with left arm pain that began 2 days ago. 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 Patient is right-handed but uses her left hand primarily. Patient reports taking Tylenol with some relief. No other exacerbating or alleviating factors. Patient denies any swelling, trauma, or falling. Patient reports NKDA. Patient admits to occasional EtOH usage, but denies smoking tobacco or illicit drug usage.     The history is provided by the patient. No  was used.   Review of patient's allergies indicates:  No Known Allergies  Past Medical History:   Diagnosis Date    Acid reflux     Allergy     Anemia     Hypertension      Past Surgical History:   Procedure Laterality Date    TUBAL LIGATION       Family History   Problem Relation Age of Onset    Hypertension Mother     Hypertension Sister     Cirrhosis Father     Alcohol abuse Father      Social History     Tobacco Use    Smoking status: Former    Smokeless tobacco: Never   Substance Use Topics    Alcohol use: Not Currently     Alcohol/week: 0.0 standard drinks     Comment: socially    Drug use: No     Review of Systems   Constitutional:  Negative for activity change, appetite change, chills and fever.   HENT:  Negative for congestion, rhinorrhea,  sneezing and sore throat.    Respiratory:  Negative for cough, choking, shortness of breath and wheezing.    Cardiovascular:  Negative for chest pain and palpitations.   Gastrointestinal:  Negative for abdominal pain, diarrhea, nausea and vomiting.   Musculoskeletal:  Positive for arthralgias and myalgias (left sided arm pain). Negative for joint swelling.   Skin:  Negative for color change, pallor, rash and wound.   Neurological:  Negative for dizziness, syncope, weakness, light-headedness, numbness and headaches.   All other systems reviewed and are negative.    Physical Exam     Initial Vitals [01/15/23 1250]   BP Pulse Resp Temp SpO2   128/76 67 18 98.5 °F (36.9 °C) 98 %      MAP       --         Physical Exam    Nursing note and vitals reviewed.  Constitutional: She appears well-developed and well-nourished. No distress.   HENT:   Head: Normocephalic and atraumatic.   Eyes: Conjunctivae are normal.   Neck:   Normal range of motion.  Cardiovascular:  Normal rate, regular rhythm and normal heart sounds.           No murmur heard.  Pulmonary/Chest: Breath sounds normal. No respiratory distress.   Abdominal: Bowel sounds are normal. She exhibits no distension.   Musculoskeletal:         General: Tenderness (muscular tenderness from shoulder to lower arm.) present. Normal range of motion.      Left shoulder: No deformity.      Left upper arm: No deformity.      Left elbow: No deformity.      Left forearm: No deformity.      Left wrist: No deformity.      Left hand: No deformity.      Cervical back: Normal range of motion.     Neurological: She is alert and oriented to person, place, and time.   Skin: Skin is warm and dry. No rash noted. No erythema.   Psychiatric: She has a normal mood and affect. Her behavior is normal.       ED Course   Procedures  Labs Reviewed - No data to display       Imaging Results              X-Ray Shoulder 2 or More Views Left (Final result)  Result time 01/15/23 14:05:53      Final  result by Ynaeth Hill MD (01/15/23 14:05:53)                   Impression:      Normal exam.      Electronically signed by: Yaneth Hill MD  Date:    01/15/2023  Time:    14:05               Narrative:    EXAMINATION:  XR SHOULDER COMPLETE 2 OR MORE VIEWS LEFT    CLINICAL HISTORY:  left shoulder pain;    TECHNIQUE:  Two or three views of the left shoulder were performed.    COMPARISON:  None    FINDINGS:  This exam is limited by positioning.  The osseous structures, soft tissues and joint spaces as well as the visualized portions of the chest are normal.                                       Medications - No data to display  Medical Decision Making:   History:   Old Medical Records: I decided to obtain old medical records.  Clinical Tests:   Radiological Study: Ordered and Reviewed  ED Management:  52F with left arm pain. Works as  with repeated lifting but no discrete injury. Has muscle tenderness on exam. No joint swelling or deformity. Normal ROM. No weakness. Xray with no acute bony abnormality, no fracture, no dislocation. I suspect muscle strain or overuse injury. Will treat with motrin, heat/ice for comfort.         Scribe Attestation:   Scribe #1: I performed the above scribed service and the documentation accurately describes the services I performed. I attest to the accuracy of the note.                   Clinical Impression:   Final diagnoses:  [M79.602] Left arm pain (Primary)        ED Disposition Condition    Discharge Stable        I, Dr. Siria Lance, personally performed the services described in this documentation.   All medical record entries made by the scribe were at my direction and in my presence.   I have reviewed the chart and agree that the record is accurate and complete.   Siria Lance MD.  4:13 PM 01/15/2023    ED Prescriptions       Medication Sig Dispense Start Date End Date Auth. Provider    ibuprofen (ADVIL,MOTRIN) 600 MG tablet Take 1 tablet (600 mg total) by mouth  every 6 (six) hours as needed for Pain. 20 tablet 1/15/2023 -- Siria Lance MD          Follow-up Information    None          Siria Lance MD  01/15/23 2299

## 2023-01-15 NOTE — Clinical Note
"Herminia"Gordy Boyle was seen and treated in our emergency department on 1/15/2023.  She may return to work on 01/16/2023.       If you have any questions or concerns, please don't hesitate to call.      Siria Lance MD"

## 2023-01-15 NOTE — DISCHARGE INSTRUCTIONS
Apply heat or ice to areas of pain. Take motrin every 6 hours. See your doctor for any concerns or worsening symptoms.

## 2023-07-31 ENCOUNTER — HOSPITAL ENCOUNTER (EMERGENCY)
Facility: HOSPITAL | Age: 53
Discharge: HOME | End: 2023-07-31
Attending: EMERGENCY MEDICINE
Payer: COMMERCIAL

## 2023-07-31 VITALS
OXYGEN SATURATION: 100 % | HEIGHT: 64 IN | DIASTOLIC BLOOD PRESSURE: 79 MMHG | HEART RATE: 82 BPM | RESPIRATION RATE: 16 BRPM | WEIGHT: 153 LBS | SYSTOLIC BLOOD PRESSURE: 128 MMHG | BODY MASS INDEX: 26.12 KG/M2 | TEMPERATURE: 99 F

## 2023-07-31 DIAGNOSIS — J30.9 ALLERGIC RHINITIS, UNSPECIFIED SEASONALITY, UNSPECIFIED TRIGGER: ICD-10-CM

## 2023-07-31 DIAGNOSIS — U07.1 COVID-19 VIRUS INFECTION: Primary | ICD-10-CM

## 2023-07-31 PROCEDURE — 99284 EMERGENCY DEPT VISIT MOD MDM: CPT | Mod: ER

## 2023-07-31 PROCEDURE — 87804 INFLUENZA ASSAY W/OPTIC: CPT | Mod: 59,ER

## 2023-07-31 PROCEDURE — 87635 SARS-COV-2 COVID-19 AMP PRB: CPT | Mod: ER | Performed by: NURSE PRACTITIONER

## 2023-07-31 RX ORDER — DEXTROMETHORPHAN HYDROBROMIDE, GUAIFENESIN 5; 100 MG/5ML; MG/5ML
650 LIQUID ORAL EVERY 8 HOURS
Qty: 20 TABLET | Refills: 0 | Status: SHIPPED | OUTPATIENT
Start: 2023-07-31 | End: 2023-08-07

## 2023-07-31 RX ORDER — LORATADINE 10 MG/1
10 TABLET ORAL DAILY
Qty: 30 TABLET | Refills: 0 | Status: SHIPPED | OUTPATIENT
Start: 2023-07-31 | End: 2023-08-30

## 2023-07-31 RX ORDER — IBUPROFEN 600 MG/1
600 TABLET ORAL EVERY 6 HOURS PRN
Qty: 20 TABLET | Refills: 0 | Status: SHIPPED | OUTPATIENT
Start: 2023-07-31 | End: 2023-08-05

## 2023-07-31 RX ORDER — ALBUTEROL SULFATE 90 UG/1
1-2 AEROSOL, METERED RESPIRATORY (INHALATION) EVERY 6 HOURS PRN
Qty: 18 G | Refills: 0 | Status: SHIPPED | OUTPATIENT
Start: 2023-07-31 | End: 2023-07-31 | Stop reason: SDUPTHER

## 2023-07-31 RX ORDER — AZELASTINE 1 MG/ML
1 SPRAY, METERED NASAL 2 TIMES DAILY
Qty: 30 ML | Refills: 0 | Status: SHIPPED | OUTPATIENT
Start: 2023-07-31 | End: 2023-08-30

## 2023-07-31 RX ORDER — ALBUTEROL SULFATE 90 UG/1
1-2 AEROSOL, METERED RESPIRATORY (INHALATION) EVERY 6 HOURS PRN
Qty: 18 G | Refills: 0 | Status: SHIPPED | OUTPATIENT
Start: 2023-07-31 | End: 2023-08-30

## 2023-07-31 NOTE — ED PROVIDER NOTES
Encounter Date: 7/31/2023    SCRIBE #1 NOTE: I, Lorrie Vann, am scribing for, and in the presence of,  BOYD Tirado. I have scribed the following portions of the note - Other sections scribed: HPI; ROS; PE.       History     Chief Complaint   Patient presents with    Nasal Congestion     A 53 y/o female, with HX Asthma, presents to the ED c/o congestion x 3 days. The pt has been taking OTC medication without relief.      52 y.o. female with a PMH of HTN who presents to the Emergency Department with complaints of nasal congestion onset 3 days ago.  She denies rash, fever, chest pain, SOB, numbness, weakness, tingling, abdominal pain, back pain, dysuria, hematuria, nausea, vomiting, diarrhea, or any other complaints.   She denies pain and has taken OTC medications for the symptoms.  No alleviating/aggravating factors.  Patient does not use tobacco or recreational drugs, but occasionally uses EtOH. Patient does not have any medical allergies.       The history is provided by the patient. No  was used.     Review of patient's allergies indicates:  No Known Allergies  Past Medical History:   Diagnosis Date    Acid reflux     Allergy     Anemia     Hypertension      Past Surgical History:   Procedure Laterality Date    TUBAL LIGATION       Family History   Problem Relation Age of Onset    Hypertension Mother     Hypertension Sister     Cirrhosis Father     Alcohol abuse Father      Social History     Tobacco Use    Smoking status: Former     Current packs/day: 0.00    Smokeless tobacco: Never   Substance Use Topics    Alcohol use: Not Currently     Alcohol/week: 0.0 standard drinks of alcohol     Comment: socially    Drug use: No     Review of Systems   Constitutional:  Negative for chills and fever.   HENT:  Positive for congestion. Negative for ear pain, rhinorrhea, sore throat and trouble swallowing.    Eyes:  Negative for pain, discharge and redness.   Respiratory:  Negative for cough and  shortness of breath.    Cardiovascular:  Negative for chest pain.   Gastrointestinal:  Negative for abdominal pain, diarrhea, nausea and vomiting.   Genitourinary:  Negative for decreased urine volume and dysuria.   Musculoskeletal:  Negative for back pain, neck pain and neck stiffness.   Skin:  Negative for rash.   Neurological:  Negative for dizziness, weakness, light-headedness, numbness and headaches.   Psychiatric/Behavioral:  Negative for confusion.        Physical Exam     Initial Vitals [07/31/23 1056]   BP Pulse Resp Temp SpO2   128/79 82 16 98.7 °F (37.1 °C) 100 %      MAP       --         Physical Exam    Nursing note and vitals reviewed.  Constitutional: Vital signs are normal. She appears well-developed.  Non-toxic appearance. She does not appear ill.   HENT:   Head: Normocephalic and atraumatic.   Right Ear: Tympanic membrane and ear canal normal.   Left Ear: Tympanic membrane and ear canal normal.   Nose: Mucosal edema present.   Mouth/Throat: Uvula is midline, oropharynx is clear and moist and mucous membranes are normal. No trismus in the jaw.   Post nasal drip   Eyes: Conjunctivae are normal.   Neck: No Brudzinski's sign and no Kernig's sign noted.   No meningeal signs.    Normal range of motion.  Cardiovascular:  Normal rate and regular rhythm.           Pulmonary/Chest: Effort normal and breath sounds normal. She exhibits no tenderness.   Abdominal: Abdomen is soft. There is no abdominal tenderness.   Musculoskeletal:      Cervical back: Normal range of motion.     Lymphadenopathy:     She has no cervical adenopathy.   No cervical lymphadenopathy.    Neurological: She is alert and oriented to person, place, and time. Gait normal. GCS eye subscore is 4. GCS verbal subscore is 5. GCS motor subscore is 6.   Skin: Skin is warm, dry and intact. No rash noted.   Psychiatric: She has a normal mood and affect. Her speech is normal and behavior is normal. Judgment and thought content normal.         ED  Course   Procedures  Labs Reviewed   SARS-COV-2 RDRP GENE - Abnormal; Notable for the following components:       Result Value    POC Rapid COVID Positive (*)     All other components within normal limits    Narrative:     This test utilizes isothermal nucleic acid amplification technology to detect the SARS-CoV-2 RdRp nucleic acid segment. The analytical sensitivity (limit of detection) is 500 copies/swab.     A POSITIVE result is indicative of the presence of SARS-CoV-2 RNA; clinical correlation with patient history and other diagnostic information is necessary to determine patient infection status.    A NEGATIVE result means that SARS-CoV-2 nucleic acids are not present above the limit of detection. A NEGATIVE result should be treated as presumptive. It does not rule out the possibility of COVID-19 and should not be the sole basis for treatment decisions. If COVID-19 is strongly suspected based on clinical and exposure history, re-testing using an alternate molecular assay should be considered.     This test is only for use under the Food and Drug Administration s Emergency Use Authorization (EUA).     Commercial kits are provided by [employer]. Performance characteristics of the EUA have been independently verified by [facility] Department of Pathology and Laboratory Medicine.   _________________________________________________________________   The authorized Fact Sheet for Healthcare Providers and the authorized Fact Sheet for Patients of the ID NOW COVID-19 are available on the FDA website:    https://www.fda.gov/media/253863/download      https://www.fda.gov/media/476261/download      POCT INFLUENZA A/B MOLECULAR   POCT RAPID INFLUENZA A/B          Imaging Results    None          Medications - No data to display  Medical Decision Making:   History:   Old Medical Records: I decided to obtain old medical records.  Old Records Summarized: other records and records from clinic visits.        <> Summary of Records: External documents reviewed.   Clinical Tests:   Lab Tests: Ordered and Reviewed       APC / Resident Notes:   This is an urgent evaluation of a 52 y.o. female that presents to the Emergency Department for URI Symptoms for 3 days.  The patient is a non-toxic, afebrile, and well appearing female. On physical exam: Ears: without infection.  Pharynx without infection. Appears well hydrated with moist mucus membranes. Neck soft and supple with no meningeal signs or cervical lymphadenopathy.  Breath sounds are clear and equal bilaterally with no adventitious breath sounds, tachypnea or respiratory distress.  Oxygen saturation is 100% on Room Air, no evidence of hypoxia.     Vital Signs: 128/79, 98.7, 82, 16, 100%   If available, previous records reviewed.   Flu: Negative  COVID-19: Positive    Given the above findings, my overall impression is COVID Infection, allergic rhinitis. Given the above findings, I do not think the patient has COVID, Flu, OM, OE, strep pharyngitis, meningitis, pneumonia, bacterial sinusitis, or significant dehydration requiring IV fluids or admission    The patient will be discharged home with Claritin, Astelin, Albuterol. Additional home care recommendations include Tylenol/Ibuprofen, Hydration. The diagnosis, treatment plan, instructions for follow-up, strict return precautions, and reevaluation with her PCP as well as ED return precautions have been discussed with the patient and she has verbalized an understanding of the information.  All questions or concerns from the patient have been addressed.          Scribe Attestation:   Scribe #1: I performed the above scribed service and the documentation accurately describes the services I performed. I attest to the accuracy of the note.              Scribe attestation: I, LEI Tirado, personally performed the services described in this documentation.  All medical record entries made by the scribe were at my direction  and in my presence.  I have reviewed the chart and agree that the record reflects my personal performance and is accurate and complete.         Clinical Impression:   Final diagnoses:  [U07.1] COVID-19 virus infection (Primary)  [J30.9] Allergic rhinitis, unspecified seasonality, unspecified trigger        ED Disposition Condition    Discharge Stable          ED Prescriptions       Medication Sig Dispense Start Date End Date Auth. Provider    albuterol (PROVENTIL/VENTOLIN HFA) 90 mcg/actuation inhaler  (Status: Discontinued) Inhale 1-2 puffs into the lungs every 6 (six) hours as needed for Wheezing. Rescue 18 g 7/31/2023 7/31/2023 BOYD Herbert    loratadine (CLARITIN) 10 mg tablet Take 1 tablet (10 mg total) by mouth once daily. 30 tablet 7/31/2023 8/30/2023 BOYD Herbert    azelastine (ASTELIN) 137 mcg (0.1 %) nasal spray 1 spray (137 mcg total) by Nasal route 2 (two) times daily. 30 mL 7/31/2023 8/30/2023 BOYD Herbert    acetaminophen (TYLENOL) 650 MG TbSR Take 1 tablet (650 mg total) by mouth every 8 (eight) hours. for 7 days 20 tablet 7/31/2023 8/7/2023 BOYD Herbert    ibuprofen (ADVIL,MOTRIN) 600 MG tablet Take 1 tablet (600 mg total) by mouth every 6 (six) hours as needed for Pain. 20 tablet 7/31/2023 8/5/2023 BOYD Herbert    albuterol (PROVENTIL/VENTOLIN HFA) 90 mcg/actuation inhaler Inhale 1-2 puffs into the lungs every 6 (six) hours as needed for Wheezing. Rescue 18 g 7/31/2023 8/30/2023 BOYD Herbert          Follow-up Information       Follow up With Specialties Details Why Contact Info     [facility] [place]  Schedule an appointment as soon as possible for a visit in 2 days  [address] [place] [address] [phone]      [facility] ED Emergency Medicine Go to  If symptoms worsen [address] [facility] [address] [phone]             BOYD Herbert  07/31/23 3845

## 2023-07-31 NOTE — Clinical Note
"Herminia"Gordy Boyle was seen and treated in our emergency department on 7/31/2023.     COVID-19 is present in our communities across the state. There is limited testing for COVID at this time, so not all patients can be tested. In this situation, your employee meets the following criteria:    Herminia Boyle has met the criteria for COVID-19 testing and has a POSITIVE result. She can return to work once they are asymptomatic for 24 hours without the use of fever reducing medications AND at least five days from the first positive result. A mask is recommended for 5 days post quarantine.     If you have any questions or concerns, or if I can be of further assistance, please do not hesitate to contact me.    Sincerely,             BOYD Herbert"

## 2023-07-31 NOTE — DISCHARGE INSTRUCTIONS

## 2023-09-04 ENCOUNTER — HOSPITAL ENCOUNTER (EMERGENCY)
Facility: HOSPITAL | Age: 53
Discharge: HOME | End: 2023-09-04
Attending: EMERGENCY MEDICINE
Payer: COMMERCIAL

## 2023-09-04 VITALS
RESPIRATION RATE: 16 BRPM | WEIGHT: 155 LBS | OXYGEN SATURATION: 100 % | HEART RATE: 53 BPM | TEMPERATURE: 98 F | BODY MASS INDEX: 26.46 KG/M2 | SYSTOLIC BLOOD PRESSURE: 175 MMHG | HEIGHT: 64 IN | DIASTOLIC BLOOD PRESSURE: 79 MMHG

## 2023-09-04 DIAGNOSIS — S39.012A LUMBAR STRAIN, INITIAL ENCOUNTER: Primary | ICD-10-CM

## 2023-09-04 LAB
B-HCG UR QL: NEGATIVE
BILIRUBIN, POC UA: NEGATIVE
BLOOD, POC UA: NEGATIVE
CLARITY, POC UA: CLEAR
COLOR, POC UA: YELLOW
CTP QC/QA: YES
GLUCOSE, POC UA: NEGATIVE
KETONES, POC UA: NEGATIVE
LEUKOCYTE EST, POC UA: NEGATIVE
NITRITE, POC UA: NEGATIVE
PH UR STRIP: 6 [PH]
PROTEIN, POC UA: ABNORMAL
SPECIFIC GRAVITY, POC UA: >=1.03
UROBILINOGEN, POC UA: 1 E.U./DL

## 2023-09-04 PROCEDURE — 81025 URINE PREGNANCY TEST: CPT | Mod: ER | Performed by: EMERGENCY MEDICINE

## 2023-09-04 PROCEDURE — 99284 EMERGENCY DEPT VISIT MOD MDM: CPT | Mod: 25,ER

## 2023-09-04 PROCEDURE — 96372 THER/PROPH/DIAG INJ SC/IM: CPT | Performed by: EMERGENCY MEDICINE

## 2023-09-04 PROCEDURE — 81025 URINE PREGNANCY TEST: CPT | Mod: ER

## 2023-09-04 PROCEDURE — 63600175 PHARM REV CODE 636 W HCPCS: Mod: ER | Performed by: EMERGENCY MEDICINE

## 2023-09-04 RX ORDER — METHOCARBAMOL 750 MG/1
1500 TABLET, FILM COATED ORAL 3 TIMES DAILY
Qty: 30 TABLET | Refills: 0 | Status: SHIPPED | OUTPATIENT
Start: 2023-09-04 | End: 2023-09-09

## 2023-09-04 RX ORDER — ORPHENADRINE CITRATE 30 MG/ML
60 INJECTION INTRAMUSCULAR; INTRAVENOUS
Status: COMPLETED | OUTPATIENT
Start: 2023-09-04 | End: 2023-09-04

## 2023-09-04 RX ORDER — IBUPROFEN 600 MG/1
600 TABLET ORAL EVERY 6 HOURS PRN
Qty: 20 TABLET | Refills: 0 | Status: SHIPPED | OUTPATIENT
Start: 2023-09-04

## 2023-09-04 RX ORDER — KETOROLAC TROMETHAMINE 30 MG/ML
30 INJECTION, SOLUTION INTRAMUSCULAR; INTRAVENOUS
Status: COMPLETED | OUTPATIENT
Start: 2023-09-04 | End: 2023-09-04

## 2023-09-04 RX ADMIN — KETOROLAC TROMETHAMINE 30 MG: 30 INJECTION, SOLUTION INTRAMUSCULAR; INTRAVENOUS at 01:09

## 2023-09-04 RX ADMIN — ORPHENADRINE CITRATE 60 MG: 60 INJECTION INTRAMUSCULAR; INTRAVENOUS at 01:09

## 2023-09-04 NOTE — ED PROVIDER NOTES
Encounter Date: 9/4/2023    SCRIBE #1 NOTE: I, Gunjan Garces, am scribing for, and in the presence of,  Siria Lance MD. I have scribed the following portions of the note - Other sections scribed: HPI, ROS, PE.       History     Chief Complaint   Patient presents with    Multiple Complaints     Complains of pain across upper/middle back that worsens with movement x3 days. States she may have hurt it lifting at work. Also, complains of urinary frequency x2 days. Denies dysuria or hematuria.     Herminia Boyle is a 53 y.o. female, with a PMHx of HTN, who presents to the ED with back pain that began 2 days ago. Patient reports the entirety of her lower back hurts, and radiates to her sides. She denies any recent injuries or trauma, but states she is a  and may have injured herself working. She states the pain has kept her from sleeping for 2 nights, stating laying down exacerbates the pain. She attempted Tx with Aleve with no relief, but states Bengay cream brought some temporary relief. She also reports increased urination. No other exacerbating or alleviating factors. Denies dysuria, numbness, weakness, nausea, vomiting, diarrhea or other associated symptoms. NKDA. Patient endorses compliance with daily antihypertensives.       The history is provided by the patient. No  was used.     Review of patient's allergies indicates:  No Known Allergies  Past Medical History:   Diagnosis Date    Acid reflux     Allergy     Anemia     Hypertension      Past Surgical History:   Procedure Laterality Date    TUBAL LIGATION       Family History   Problem Relation Age of Onset    Hypertension Mother     Hypertension Sister     Cirrhosis Father     Alcohol abuse Father      Social History     Tobacco Use    Smoking status: Former    Smokeless tobacco: Never   Substance Use Topics    Alcohol use: Not Currently     Alcohol/week: 0.0 standard drinks of alcohol     Comment: socially    Drug use: No      Review of Systems   Constitutional:  Negative for activity change, appetite change, chills and fever.   HENT:  Negative for congestion, rhinorrhea, sneezing and sore throat.    Respiratory:  Negative for cough, choking, shortness of breath and wheezing.    Cardiovascular:  Negative for chest pain and palpitations.   Gastrointestinal:  Negative for abdominal pain, diarrhea, nausea and vomiting.   Genitourinary:  Positive for frequency. Negative for dysuria.   Musculoskeletal:  Positive for back pain and myalgias (bilateral sides, radiating from back pain). Negative for gait problem.   Neurological:  Negative for dizziness, syncope, weakness, light-headedness, numbness and headaches.   All other systems reviewed and are negative.      Physical Exam     Initial Vitals [09/04/23 1028]   BP Pulse Resp Temp SpO2   (!) 155/88 65 14 98.3 °F (36.8 °C) 100 %      MAP       --         Physical Exam    Nursing note and vitals reviewed.  Constitutional: She appears well-developed and well-nourished. No distress.   HENT:   Head: Normocephalic and atraumatic.   Eyes: Conjunctivae are normal.   Neck:   Normal range of motion.  Cardiovascular:  Normal rate, regular rhythm and normal heart sounds.           No murmur heard.  Pulmonary/Chest: Breath sounds normal. No respiratory distress.   Abdominal: Abdomen is soft. Bowel sounds are normal. She exhibits no distension. There is no abdominal tenderness.   Musculoskeletal:         General: No tenderness or edema. Normal range of motion.      Cervical back: Normal range of motion.      Comments: No bony midline tenderness. Muscular tenderness to bilateral paralumbar muscles.      Neurological: She is alert and oriented to person, place, and time. GCS score is 15. GCS eye subscore is 4. GCS verbal subscore is 5. GCS motor subscore is 6.   Skin: Skin is warm and dry.   Psychiatric: She has a normal mood and affect. Her behavior is normal.         ED Course   Procedures  Labs Reviewed    POCT URINALYSIS W/O SCOPE - Abnormal; Notable for the following components:       Result Value    Spec Grav UA >=1.030 (*)     Protein, UA 1+ (*)     All other components within normal limits   POCT URINE PREGNANCY   POCT URINALYSIS W/O SCOPE          Imaging Results    None          Medications   ketorolac injection 30 mg (30 mg Intramuscular Given 9/4/23 1311)   orphenadrine injection 60 mg (60 mg Intramuscular Given 9/4/23 1311)     Medical Decision Making  Herminia Boyle is a 53 y.o. female, with a PMHx of HTN, who presents to the ED with back pain that began 2 days ago. Patient reports the entirety of her back hurts, and radiates to her sides. She denies any recent injuries or trauma, but states she is a  and may have injured herself working. She states the pain has kept her from sleeping for 2 nights, stating laying down exacerbates the pain. She attempted Tx with Aleve with no relief, but states Bengay cream brought some temporary relief. She also reports increased urination. No other exacerbating or alleviating factors. Denies dysuria, numbness, weakness, nausea, vomiting, diarrhea or other associated symptoms. NKDA. Patient endorses compliance with daily antihypertensives. On exam, there is no bony midline tenderness but there is muscular tenderness to bilateral paralumbar muscles. In shared decision making with patient, will order labs. I considered but excluded UTI, pyelonephritis, kidney stone, cauda equina. I feel this is most likely muscle strain or overuse injury. Will treat pain with motrin and robaxin. Pt requested 2 more days off work.        Amount and/or Complexity of Data Reviewed  Labs: ordered.    Risk  Prescription drug management.            Scribe Attestation:   Scribe #1: I performed the above scribed service and the documentation accurately describes the services I performed. I attest to the accuracy of the note.                      I, Dr. Siria Lance, personally performed  the services described in this documentation.   All medical record entries made by the scribe were at my direction and in my presence.   I have reviewed the chart and agree that the record is accurate and complete.   Siria Lance MD.  12:04 PM 09/04/2023     Clinical Impression:   Final diagnoses:  [S39.012A] Lumbar strain, initial encounter (Primary)        ED Disposition Condition    Discharge Stable          ED Prescriptions       Medication Sig Dispense Start Date End Date Auth. Provider    ibuprofen (ADVIL,MOTRIN) 600 MG tablet Take 1 tablet (600 mg total) by mouth every 6 (six) hours as needed for Pain. 20 tablet 9/4/2023 -- Siria Lance MD    methocarbamoL (ROBAXIN) 750 MG Tab Take 2 tablets (1,500 mg total) by mouth 3 (three) times daily. for 5 days 30 tablet 9/4/2023 9/9/2023 Siria Lance MD          Follow-up Information    None          Siria Lance MD  09/04/23 5171

## 2023-09-04 NOTE — Clinical Note
"Herminia"Gordy Boyle was seen and treated in our emergency department on 9/4/2023.  She may return to work on 09/05/2023.       If you have any questions or concerns, please don't hesitate to call.      Siria Lance MD"

## 2023-09-04 NOTE — DISCHARGE INSTRUCTIONS
Apply heating pad for 20 minutes then do gentle stretching. Take medications as directed. You may wear an over the counter pain patch like Thermacare or Salon Pas while not using the heating pad.

## 2023-09-04 NOTE — Clinical Note
"Herminia Dowd" [place] was seen and treated in our emergency department on 9/4/2023.  She may return to work on 09/07/2023.       If you have any questions or concerns, please don't hesitate to call.      Steph harris RN    "

## 2023-12-27 ENCOUNTER — TELEPHONE (OUTPATIENT)
Dept: SURGERY | Facility: CLINIC | Age: 53
End: 2023-12-27
Payer: COMMERCIAL

## 2023-12-27 NOTE — TELEPHONE ENCOUNTER
----- Message from Steve Henry LPN sent at 12/26/2023 10:49 AM CST -----  Regarding: FW: Referral faxed over  Patient has a referral, I emailed you the referral that was faxed to me.    ----- Message -----  From: Mara Sheridan  Sent: 12/26/2023   9:25 AM CST  To: [facility]  Subject: Referral faxed over                              Name of Who is Calling: LISET RIVERA [[id number]] Laura ([facility])        What is the request in detail: States faxed over referral        Can the clinic reply by [facility]: No        What Number to Call Back if not in [facility]: [phone]

## 2023-12-28 ENCOUNTER — HOSPITAL ENCOUNTER (OUTPATIENT)
Dept: RADIOLOGY | Facility: HOSPITAL | Age: 53
Discharge: HOME | End: 2023-12-28
Attending: NURSE PRACTITIONER
Payer: COMMERCIAL

## 2023-12-28 PROCEDURE — 76140 NOMH MAMMO INTERPRETATION OF OUTSIDE FILMS: ICD-10-PCS | Mod: ,,, | Performed by: RADIOLOGY

## 2023-12-28 PROCEDURE — 76140 X-RAY CONSULTATION: CPT | Mod: ,,, | Performed by: RADIOLOGY

## 2024-01-11 ENCOUNTER — HOSPITAL ENCOUNTER (OUTPATIENT)
Dept: RADIOLOGY | Facility: HOSPITAL | Age: 54
Discharge: HOME | End: 2024-01-11
Attending: NURSE PRACTITIONER
Payer: COMMERCIAL

## 2024-01-11 DIAGNOSIS — R92.8 ABNORMAL FINDING ON BREAST IMAGING: ICD-10-CM

## 2024-01-11 PROCEDURE — 77065 DX MAMMO INCL CAD UNI: CPT | Mod: 26,RT,, | Performed by: RADIOLOGY

## 2024-01-11 PROCEDURE — 76642 ULTRASOUND BREAST LIMITED: CPT | Mod: TC,RT

## 2024-01-11 PROCEDURE — 76642 ULTRASOUND BREAST LIMITED: CPT | Mod: 26,RT,, | Performed by: RADIOLOGY

## 2024-01-11 PROCEDURE — 77065 DX MAMMO INCL CAD UNI: CPT | Mod: TC,RT

## 2024-01-11 PROCEDURE — 77061 BREAST TOMOSYNTHESIS UNI: CPT | Mod: 26,RT,, | Performed by: RADIOLOGY

## 2024-01-11 PROCEDURE — 77061 BREAST TOMOSYNTHESIS UNI: CPT | Mod: TC,RT

## 2024-01-12 ENCOUNTER — TELEPHONE (OUTPATIENT)
Dept: RADIOLOGY | Facility: HOSPITAL | Age: 54
End: 2024-01-12
Payer: COMMERCIAL

## 2024-06-03 ENCOUNTER — HOSPITAL ENCOUNTER (EMERGENCY)
Facility: HOSPITAL | Age: 54
Discharge: HOME | End: 2024-06-03
Attending: EMERGENCY MEDICINE
Payer: COMMERCIAL

## 2024-06-03 VITALS
RESPIRATION RATE: 18 BRPM | HEART RATE: 75 BPM | TEMPERATURE: 99 F | SYSTOLIC BLOOD PRESSURE: 120 MMHG | WEIGHT: 154.38 LBS | OXYGEN SATURATION: 98 % | BODY MASS INDEX: 26.49 KG/M2 | DIASTOLIC BLOOD PRESSURE: 80 MMHG

## 2024-06-03 DIAGNOSIS — R11.0 NAUSEA: Primary | ICD-10-CM

## 2024-06-03 DIAGNOSIS — R10.13 EPIGASTRIC PAIN: ICD-10-CM

## 2024-06-03 DIAGNOSIS — M79.10 MYALGIA: ICD-10-CM

## 2024-06-03 DIAGNOSIS — K21.9 GASTROESOPHAGEAL REFLUX DISEASE, UNSPECIFIED WHETHER ESOPHAGITIS PRESENT: ICD-10-CM

## 2024-06-03 DIAGNOSIS — R51.9 ACUTE NONINTRACTABLE HEADACHE, UNSPECIFIED HEADACHE TYPE: ICD-10-CM

## 2024-06-03 LAB
BILIRUBIN, POC UA: NEGATIVE
BLOOD, POC UA: NEGATIVE
CLARITY, POC UA: CLEAR
COLOR, POC UA: YELLOW
GLUCOSE, POC UA: NEGATIVE
KETONES, POC UA: ABNORMAL
LEUKOCYTE EST, POC UA: NEGATIVE
NITRITE, POC UA: NEGATIVE
PH UR STRIP: 6 [PH]
PROTEIN, POC UA: NEGATIVE
SPECIFIC GRAVITY, POC UA: 1.02
UROBILINOGEN, POC UA: >=8 E.U./DL

## 2024-06-03 PROCEDURE — 96372 THER/PROPH/DIAG INJ SC/IM: CPT | Performed by: PHYSICIAN ASSISTANT

## 2024-06-03 PROCEDURE — 63600175 PHARM REV CODE 636 W HCPCS: Mod: ER | Performed by: PHYSICIAN ASSISTANT

## 2024-06-03 PROCEDURE — 99284 EMERGENCY DEPT VISIT MOD MDM: CPT | Mod: 25,ER

## 2024-06-03 PROCEDURE — 25000003 PHARM REV CODE 250: Mod: ER | Performed by: PHYSICIAN ASSISTANT

## 2024-06-03 RX ORDER — PROMETHAZINE HYDROCHLORIDE 25 MG/1
25 TABLET ORAL
Status: COMPLETED | OUTPATIENT
Start: 2024-06-03 | End: 2024-06-03

## 2024-06-03 RX ORDER — KETOROLAC TROMETHAMINE 30 MG/ML
30 INJECTION, SOLUTION INTRAMUSCULAR; INTRAVENOUS
Status: COMPLETED | OUTPATIENT
Start: 2024-06-03 | End: 2024-06-03

## 2024-06-03 RX ORDER — ONDANSETRON 4 MG/1
4 TABLET, ORALLY DISINTEGRATING ORAL EVERY 6 HOURS PRN
Qty: 15 TABLET | Refills: 0 | Status: SHIPPED | OUTPATIENT
Start: 2024-06-03 | End: 2024-06-08

## 2024-06-03 RX ORDER — ALUMINUM HYDROXIDE, MAGNESIUM HYDROXIDE, AND SIMETHICONE 1200; 120; 1200 MG/30ML; MG/30ML; MG/30ML
30 SUSPENSION ORAL
Status: DISCONTINUED | OUTPATIENT
Start: 2024-06-03 | End: 2024-06-03 | Stop reason: HOSPADM

## 2024-06-03 RX ORDER — ACETAMINOPHEN 500 MG
500 TABLET ORAL EVERY 4 HOURS PRN
Qty: 20 TABLET | Refills: 0 | Status: SHIPPED | OUTPATIENT
Start: 2024-06-03 | End: 2024-06-08

## 2024-06-03 RX ORDER — DOCUSATE SODIUM 100 MG/1
100 CAPSULE, LIQUID FILLED ORAL 2 TIMES DAILY
Qty: 20 CAPSULE | Refills: 0 | Status: SHIPPED | OUTPATIENT
Start: 2024-06-03 | End: 2024-06-13

## 2024-06-03 RX ORDER — IBUPROFEN 600 MG/1
600 TABLET ORAL EVERY 6 HOURS PRN
Qty: 20 TABLET | Refills: 0 | Status: SHIPPED | OUTPATIENT
Start: 2024-06-03 | End: 2024-06-08

## 2024-06-03 RX ORDER — PANTOPRAZOLE SODIUM 40 MG/1
40 TABLET, DELAYED RELEASE ORAL DAILY
Qty: 30 TABLET | Refills: 0 | Status: SHIPPED | OUTPATIENT
Start: 2024-06-03 | End: 2024-07-03

## 2024-06-03 RX ORDER — PROMETHAZINE HYDROCHLORIDE 25 MG/1
25 TABLET ORAL EVERY 6 HOURS PRN
Qty: 15 TABLET | Refills: 0 | Status: SHIPPED | OUTPATIENT
Start: 2024-06-03

## 2024-06-03 RX ADMIN — PROMETHAZINE HYDROCHLORIDE 25 MG: 25 TABLET ORAL at 06:06

## 2024-06-03 RX ADMIN — KETOROLAC TROMETHAMINE 30 MG: 30 INJECTION, SOLUTION INTRAMUSCULAR at 06:06

## 2024-06-03 NOTE — DISCHARGE INSTRUCTIONS

## 2024-06-03 NOTE — Clinical Note
"Herminia Choinatalia Boyle was seen and treated in our emergency department on 6/3/2024.  She may return to work on 06/04/2024.       If you have any questions or concerns, please don't hesitate to call.      VICKI Adams RN    "

## 2024-06-04 NOTE — ED PROVIDER NOTES
Encounter Date: 6/3/2024       History     Chief Complaint   Patient presents with    Nausea     Nausea, headache and body aches for 3 days     Chief complaint: Nausea    HPI:     53-year-old female with history of hypertension, GERD, anemia presenting for evaluation of 3 day history of headache, generalized myalgias.  She reports this was preceded by nausea and diarrhea on the 1st day.  She reports she has had decreased p.o. intake.  Denies fever, nasal congestion, rhinorrhea, ear pain, sore throat, cough, chest pain, shortness breath, dizziness lightheadedness.  Denies dysuria hematuria urgency or frequency, pelvic pain, vaginal discharge or concern for STI.  She reports she was chronic constipation and epigastric pain.  She feels her symptoms are due to her chronic epigastric discomfort that is attributed to GERD.  She states she takes Gas-X daily.  Has never seen GI. Denies any melena, hematochezia, hematuria, vaginal bleeding or epistaxis or other source of bleeding.     Denies visual disturbance, gait or speech difficulty, confusion, weakness, paresthesias    The history is provided by the patient.     Review of patient's allergies indicates:  No Known Allergies  Past Medical History:   Diagnosis Date    Acid reflux     Allergy     Anemia     Hypertension      Past Surgical History:   Procedure Laterality Date    TUBAL LIGATION       Family History   Problem Relation Name Age of Onset    Hypertension Mother      Hypertension Sister      Cirrhosis Father      Alcohol abuse Father       Social History     Tobacco Use    Smoking status: Former    Smokeless tobacco: Never   Substance Use Topics    Alcohol use: Not Currently     Alcohol/week: 0.0 standard drinks of alcohol     Comment: socially    Drug use: No     Review of Systems   Constitutional:  Negative for chills and fever.   HENT:  Negative for congestion, ear pain, nosebleeds, rhinorrhea, sore throat and trouble swallowing.    Eyes:  Negative for redness.    Respiratory:  Negative for cough, shortness of breath and stridor.    Cardiovascular:  Negative for chest pain.   Gastrointestinal:  Positive for diarrhea and nausea. Negative for abdominal pain, constipation and vomiting.   Genitourinary:  Negative for decreased urine volume, dysuria, frequency, hematuria and urgency.   Musculoskeletal:  Positive for myalgias. Negative for back pain and neck pain.   Skin:  Negative for rash and wound.   Neurological:  Positive for headaches. Negative for dizziness, speech difficulty, weakness, light-headedness and numbness.   Hematological:  Does not bruise/bleed easily.   Psychiatric/Behavioral:  Negative for confusion.        Physical Exam     Initial Vitals [06/03/24 1720]   BP Pulse Resp Temp SpO2   120/80 75 18 98.6 °F (37 °C) 98 %      MAP       --         Physical Exam    Nursing note and vitals reviewed.  Constitutional: She appears well-developed and well-nourished. No distress.   HENT:   Head: Normocephalic.   Right Ear: External ear normal.   Left Ear: External ear normal.   Eyes: Conjunctivae are normal. Right eye exhibits no discharge. Left eye exhibits no discharge. No scleral icterus.   Neck: No tracheal deviation present.   Cardiovascular:  Normal rate and regular rhythm.     Exam reveals no gallop and no friction rub.       No murmur heard.  Pulmonary/Chest: No stridor. No respiratory distress. She has no wheezes. She has no rhonchi. She has no rales.   Abdominal: Abdomen is soft. She exhibits no distension. There is abdominal tenderness in the epigastric area.   No right CVA tenderness.  No left CVA tenderness. There is no rebound, no guarding, no tenderness at McBurney's point and negative Campbell's sign. negative Rovsing's sign  Musculoskeletal:         General: Normal range of motion.      Cervical back: No spinous process tenderness or muscular tenderness.     Neurological: She is alert. She has normal strength. No cranial nerve deficit or sensory deficit.  Coordination and gait normal.   Skin: Skin is warm and dry. No rash noted. No erythema.   Psychiatric: She has a normal mood and affect. Her behavior is normal. Judgment and thought content normal.         ED Course   Procedures  Labs Reviewed   POCT URINALYSIS W/O SCOPE - Abnormal; Notable for the following components:       Result Value    Ketones, UA Trace (*)     Urobilinogen, UA >=8.0 (*)     All other components within normal limits   POCT URINALYSIS W/O SCOPE          Imaging Results    None          Medications   ketorolac injection 30 mg (30 mg Intramuscular Given 6/3/24 1816)   promethazine tablet 25 mg (25 mg Oral Given 6/3/24 1816)     Medical Decision Making  53-year-old female presenting for evaluation of headaches, nausea, myalgias preceded by diarrhea.  She reports she has had decreased p.o. intake.  She feels this is secondary to her constipation chronic GERD/gas.  Patient is afebrile nontoxic appearing in no distress.  She reports it her epigastric discomfort is the same as her usual pain.  Mild epigastric tenderness.  No peritoneal signs.  Denies any vomiting.  Considered but low suspicion for pancreatitis.  Think this is likely GERD versus gastritis versus peptic ulcer disease.  Will refer to GI for further evaluation management.  Will discharge with Protonix.  For constipation instructed to increase fiber in diet and water intake.  Attempt patient complaining of headache.  No focal neurologic deficits.  No meningeal signs.  No sinus tenderness.  No URI symptoms indicate viral etiology.  She was history of anemia.  Denies any melena, hematochezia, hematuria, vaginal bleeding or epistaxis or other source of bleeding.  Considered but low suspicion for anemia at this time.  Not hypotensive or tachycardic.  Toradol IM given in the ED for headache as well as Phenergan for nausea.  UA negative for UTI.  Will have patient follow up with primary care in 2 days return ER for worsening or as  needed.    Amount and/or Complexity of Data Reviewed  Labs: ordered.    Risk  OTC drugs.  Prescription drug management.                                      Clinical Impression:  Final diagnoses:  [R11.0] Nausea (Primary)  [R51.9] Acute nonintractable headache, unspecified headache type  [K21.9] Gastroesophageal reflux disease, unspecified whether esophagitis present  [R10.13] Epigastric pain  [M79.10] Myalgia          ED Disposition Condition    Discharge Stable          ED Prescriptions       Medication Sig Dispense Start Date End Date Auth. Provider    ibuprofen (ADVIL,MOTRIN) 600 MG tablet Take 1 tablet (600 mg total) by mouth every 6 (six) hours as needed. 20 tablet 6/3/2024 6/8/2024 Jada Goldman PA-C    acetaminophen (TYLENOL) 500 MG tablet Take 1 tablet (500 mg total) by mouth every 4 (four) hours as needed. 20 tablet 6/3/2024 6/8/2024 Jada Goldman PA-C    ondansetron (ZOFRAN-ODT) 4 MG TbDL Take 1 tablet (4 mg total) by mouth every 6 (six) hours as needed (for nausea). 15 tablet 6/3/2024 6/8/2024 Jada Goldman PA-C    promethazine (PHENERGAN) 25 MG tablet Take 1 tablet (25 mg total) by mouth every 6 (six) hours as needed for Nausea. MAY CAUSE DROWSINESS 15 tablet 6/3/2024 -- Jada Goldman PA-C    docusate sodium (COLACE) 100 MG capsule Take 1 capsule (100 mg total) by mouth 2 (two) times daily. for 10 days 20 capsule 6/3/2024 6/13/2024 Jada Goldman PA-C    pantoprazole (PROTONIX) 40 MG tablet Take 1 tablet (40 mg total) by mouth once daily. 30 tablet 6/3/2024 7/3/2024 Jada Goldman PA-C          Follow-up Information       Follow up With Specialties Details Why Contact Info    Your Primary Care Doctor  Schedule an appointment as soon as possible for a visit in 2 days      [facility] ED Emergency Medicine Go to  As needed, If symptoms worsen [facility] [facility] [id number] [phone]             Jada Goldman  PAYAM GOOD  06/03/24 2900

## 2024-09-13 ENCOUNTER — HOSPITAL ENCOUNTER (EMERGENCY)
Facility: HOSPITAL | Age: 54
Discharge: HOME | End: 2024-09-13
Attending: INTERNAL MEDICINE
Payer: COMMERCIAL

## 2024-09-13 VITALS
DIASTOLIC BLOOD PRESSURE: 86 MMHG | WEIGHT: 162.94 LBS | SYSTOLIC BLOOD PRESSURE: 134 MMHG | RESPIRATION RATE: 18 BRPM | OXYGEN SATURATION: 95 % | HEART RATE: 90 BPM | HEIGHT: 64 IN | TEMPERATURE: 99 F | BODY MASS INDEX: 27.82 KG/M2

## 2024-09-13 DIAGNOSIS — R35.0 URINARY FREQUENCY: Primary | ICD-10-CM

## 2024-09-13 LAB
BILIRUBIN, POC UA: NEGATIVE
BLOOD, POC UA: NEGATIVE
CLARITY, UA: CLEAR
COLOR, UA: YELLOW
GLUCOSE, POC UA: NEGATIVE
KETONES, POC UA: NEGATIVE
LEUKOCYTE EST, POC UA: NEGATIVE
NITRITE, POC UA: NEGATIVE
PH UR STRIP: 6.5 [PH] (ref 5–8)
POCT GLUCOSE: 120 MG/DL (ref 70–110)
PROTEIN, POC UA: NEGATIVE
SPECIFIC GRAVITY, POC UA: >=1.03 (ref 1–1.03)
UROBILINOGEN, POC UA: 1 E.U./DL

## 2024-09-13 PROCEDURE — 87086 URINE CULTURE/COLONY COUNT: CPT

## 2024-09-13 PROCEDURE — 99283 EMERGENCY DEPT VISIT LOW MDM: CPT | Mod: ER

## 2024-09-13 PROCEDURE — 82962 GLUCOSE BLOOD TEST: CPT | Mod: ER

## 2024-09-13 RX ORDER — AMOXICILLIN AND CLAVULANATE POTASSIUM 875; 125 MG/1; MG/1
1 TABLET, FILM COATED ORAL 2 TIMES DAILY
Qty: 10 TABLET | Refills: 0 | Status: SHIPPED | OUTPATIENT
Start: 2024-09-13 | End: 2024-09-18

## 2024-09-13 RX ORDER — PHENAZOPYRIDINE HYDROCHLORIDE 200 MG/1
200 TABLET, FILM COATED ORAL
Qty: 9 TABLET | Refills: 0 | Status: SHIPPED | OUTPATIENT
Start: 2024-09-13 | End: 2024-09-16

## 2024-09-14 NOTE — DISCHARGE INSTRUCTIONS

## 2024-09-14 NOTE — ED PROVIDER NOTES
Encounter Date: 9/13/2024       History     Chief Complaint   Patient presents with    Urinary Frequency     Urinary frequency and low back pain x3 days with mild dysuria.      Herminia Boyle is a 54-year-old female with no pertinent past medical history who presents to the emergency department with a chief complaint of urinary symptoms.  She has been having increased urinary frequency and dysuria for the last 3 days.  Denies flank pain, nausea, vomiting, fever.  Has had UTIs previously and states that this feels identical.  Denies vaginal bleeding or vaginal discharge.  Denies any history of diabetes, but does report that she drinks lots of soda and very little water.     The history is provided by the patient. No  was used.     Review of patient's allergies indicates:  No Known Allergies  Past Medical History:   Diagnosis Date    Acid reflux     Allergy     Anemia     Hypertension      Past Surgical History:   Procedure Laterality Date    TUBAL LIGATION       Family History   Problem Relation Name Age of Onset    Hypertension Mother      Hypertension Sister      Cirrhosis Father      Alcohol abuse Father       Social History     Tobacco Use    Smoking status: Former    Smokeless tobacco: Never   Substance Use Topics    Alcohol use: Not Currently     Alcohol/week: 0.0 standard drinks of alcohol     Comment: socially    Drug use: No     Review of Systems   Constitutional:  Negative for chills and fever.   HENT:  Negative for sore throat.    Respiratory:  Negative for shortness of breath.    Cardiovascular:  Negative for chest pain.   Gastrointestinal:  Negative for nausea.   Genitourinary:  Positive for dysuria and frequency. Negative for decreased urine volume, difficulty urinating, dyspareunia, hematuria, pelvic pain, urgency, vaginal bleeding and vaginal discharge.   Musculoskeletal:  Negative for back pain.   Skin:  Negative for rash.   Neurological:  Negative for weakness.   Hematological:   Does not bruise/bleed easily.       Physical Exam     Initial Vitals [09/13/24 1954]   BP Pulse Resp Temp SpO2   134/86 90 18 98.9 °F (37.2 °C) 95 %      MAP       --         Physical Exam    Nursing note and vitals reviewed.  Constitutional: Vital signs are normal. She appears well-developed and well-nourished. She is cooperative. She does not appear ill. No distress.   Well-appearing.  No acute distress.   HENT:   Head: Normocephalic and atraumatic.   Right Ear: Hearing and external ear normal.   Left Ear: Hearing and external ear normal.   Nose: Nose normal.   Eyes: Conjunctivae and EOM are normal.   Neck: Phonation normal.   Normal range of motion.  Cardiovascular:  Normal rate and regular rhythm.           No murmur heard.  Regular rate and rhythm.  No murmur.  No friction rub.   Pulmonary/Chest: Effort normal. No respiratory distress.   Respirations even and unlabored.   Abdominal: Abdomen is soft. She exhibits no distension. There is abdominal tenderness in the suprapubic area.   Very mild suprapubic tenderness to palpation.  No rebound or guarding.  Soft.   Musculoskeletal:      Cervical back: Normal range of motion.     Neurological: She is alert and oriented to person, place, and time. GCS eye subscore is 4. GCS verbal subscore is 5. GCS motor subscore is 6.   Skin: Skin is warm. Capillary refill takes less than 2 seconds.         ED Course   Procedures  Labs Reviewed   POCT URINALYSIS W/O SCOPE - Abnormal       Result Value    Glucose, UA Negative      Bilirubin, UA Negative      Ketones, UA Negative      Spec Grav UA >=1.030 (*)     Blood, UA Negative      PH, UA 6.5      Protein, UA Negative      Urobilinogen, UA 1.0      Nitrite, UA Negative      Leukocytes, UA Negative      Color, UA POC Yellow      Clarity, UA, POC Clear     POCT GLUCOSE - Abnormal    POCT Glucose 120 (*)    CULTURE, URINE   POCT URINALYSIS W/O SCOPE   POCT GLUCOSE MONITORING CONTINUOUS          Imaging Results    None           Medications - No data to display  Medical Decision Making  54-year-old female presenting to the emergency department with urinary symptoms including frequency and dysuria for the last 3 days.  Denies fever, chills, flank pain, or other systemic symptoms.  States feels identical to prior UTIs.  On exam, well-appearing and in no acute distress.  Vitals within normal limits.  Very mild suprapubic tenderness to palpation.    Differential diagnosis is broad and includes but is not limited to gastroenteritis, appendicitis, pancreatitis, cholecystitis, diverticulitis, peritonitis, small-bowel obstruction, epiploic appendagitis, constipation, urinary tract infection including cystitis or pyelonephritis, ovarian torsion, ruptured ovarian cyst, pregnancy, ectopic pregnancy, tubo-ovarian abscess, dysmenorrhea, pelvic inflammatory disease, sexually transmitted infection, vaginitis, cervicitis, musculoskeletal pain.     Urinalysis with high specific gravity, otherwise within normal limits.  Negative for nitrites, leukocytes, blood.  Point of care glucose 120 mg/dL.  With the patient's symptoms of frequency and dysuria, I will treat as urinary tract infection with a short course of phenazopyridine and Augmentin.  Urine culture sent.  Discussed that having concentrated urine may be the source of the patient's symptoms and urged her to increase her water intake and decrease her sugary soda intake.  Voiced her understanding.    Return precautions were discussed, all patient questions were answered, and the patient was agreeable to the plan of care.  She was discharged home in stable condition and will follow up with her primary care provider or return to the emergency department if her symptoms worsen or do not improve.     Amount and/or Complexity of Data Reviewed  Labs: ordered. Decision-making details documented in ED Course.    Risk  Prescription drug management.                                      Clinical Impression:  Final  diagnoses:  [R35.0] Urinary frequency (Primary)          ED Disposition Condition    Discharge Stable          ED Prescriptions       Medication Sig Dispense Start Date End Date Auth. Provider    phenazopyridine (PYRIDIUM) 200 MG tablet Take 1 tablet (200 mg total) by mouth 3 (three) times daily with meals. for 3 days 9 tablet 9/13/2024 9/16/2024 Jaskaran Rogers, PA-C    amoxicillin-clavulanate 875-125mg (AUGMENTIN) 875-125 mg per tablet Take 1 tablet by mouth 2 (two) times daily. for 5 days 10 tablet 9/13/2024 9/18/2024 Jaskaran Rogers, PA-C          Follow-up Information       Follow up With Specialties Details Why Contact Info    [facility] -  Schedule an appointment as soon as possible for a visit  As needed, If symptoms worsen [address]  [place] [address]  [phone]               Jaskaran Rogers, PA-C  09/13/24 1203

## 2024-09-16 LAB — BACTERIA UR CULT: NORMAL

## 2025-01-27 ENCOUNTER — HOSPITAL ENCOUNTER (EMERGENCY)
Facility: HOSPITAL | Age: 55
Discharge: HOME | End: 2025-01-27
Attending: EMERGENCY MEDICINE
Payer: COMMERCIAL

## 2025-01-27 VITALS
SYSTOLIC BLOOD PRESSURE: 149 MMHG | BODY MASS INDEX: 27.33 KG/M2 | OXYGEN SATURATION: 100 % | WEIGHT: 160.06 LBS | TEMPERATURE: 98 F | HEART RATE: 73 BPM | DIASTOLIC BLOOD PRESSURE: 87 MMHG | RESPIRATION RATE: 18 BRPM | HEIGHT: 64 IN

## 2025-01-27 DIAGNOSIS — M25.562 ACUTE PAIN OF LEFT KNEE: Primary | ICD-10-CM

## 2025-01-27 DIAGNOSIS — T14.90XA INJURY: ICD-10-CM

## 2025-01-27 DIAGNOSIS — M25.469 SUPRAPATELLAR EFFUSION OF KNEE: ICD-10-CM

## 2025-01-27 PROCEDURE — 99283 EMERGENCY DEPT VISIT LOW MDM: CPT | Mod: 25,ER

## 2025-01-27 PROCEDURE — 29505 APPLICATION LONG LEG SPLINT: CPT | Mod: LT,ER

## 2025-01-27 RX ORDER — NAPROXEN 500 MG/1
500 TABLET ORAL 2 TIMES DAILY WITH MEALS
Qty: 20 TABLET | Refills: 0 | Status: SHIPPED | OUTPATIENT
Start: 2025-01-27

## 2025-01-27 NOTE — ED PROVIDER NOTES
Encounter Date: 1/27/2025    SCRIBE #1 NOTE: I, Rekha Ortiz, am scribing for, and in the presence of,  Suhail Sellers MD. I have scribed the following portions of the note - Other sections scribed: HPI,ROS,PE.       History     Chief Complaint   Patient presents with    Knee Pain     Non traumatic left knee pain since yesterday pt ambulatory to triage      Herminia Boyle is a 54 y.o. female, with a PMHx of acid reflux, anemia, HTN, who presents to the ED with complaint of atraumatic left knee pain that began 2 days ago. Patient reports she recently started back working again as a . She states she began to feel the pain while working. Patient reports she is able to ambulate as normal despite pain. Denies any rash. No attempt at treatment. No other exacerbating or alleviating factors. Denies numbness, paresthesias, or other associated symptoms. Patient has no other complaints at the present time.       The history is provided by the patient. No  was used.     Review of patient's allergies indicates:  No Known Allergies  Past Medical History:   Diagnosis Date    Acid reflux     Allergy     Anemia     Hypertension      Past Surgical History:   Procedure Laterality Date    TUBAL LIGATION       Family History   Problem Relation Name Age of Onset    Hypertension Mother      Hypertension Sister      Cirrhosis Father      Alcohol abuse Father       Social History     Tobacco Use    Smoking status: Former    Smokeless tobacco: Never   Substance Use Topics    Alcohol use: Not Currently     Alcohol/week: 0.0 standard drinks of alcohol     Comment: socially    Drug use: No     Review of Systems   Constitutional: Negative.  Negative for fever.   HENT: Negative.  Negative for sore throat.    Eyes: Negative.    Respiratory: Negative.  Negative for shortness of breath.    Cardiovascular: Negative.  Negative for chest pain.   Gastrointestinal: Negative.  Negative for nausea and vomiting.    Endocrine: Negative.    Genitourinary: Negative.  Negative for dysuria.   Musculoskeletal:  Positive for arthralgias (left Knee (atraumatic)). Negative for myalgias.   Skin: Negative.  Negative for rash.   Allergic/Immunologic: Negative.    Neurological: Negative.  Negative for headaches.   Hematological: Negative.  Negative for adenopathy.   Psychiatric/Behavioral: Negative.  Negative for behavioral problems.    All other systems reviewed and are negative.      Physical Exam     Initial Vitals [01/27/25 1452]   BP Pulse Resp Temp SpO2   (!) 158/94 74 18 98.2 °F (36.8 °C) 100 %      MAP       --         Physical Exam    Nursing note and vitals reviewed.  Constitutional: She appears well-developed and well-nourished.   HENT:   Head: Normocephalic and atraumatic.   Eyes: Conjunctivae and EOM are normal. Pupils are equal, round, and reactive to light.   Neck: Neck supple. No thyroid mass present.   Cardiovascular:  Normal rate, regular rhythm, S1 normal, S2 normal, normal heart sounds and intact distal pulses.           Pulmonary/Chest: Effort normal and breath sounds normal. No respiratory distress.   Abdominal: Abdomen is soft. Bowel sounds are normal.   Musculoskeletal:         General: No tenderness. Normal range of motion.      Cervical back: Neck supple.      Right knee: Normal.      Left knee: No erythema.      Comments: Pain to left anterior thigh extending to suprapatellar region      Lymphadenopathy:     She has no axillary adenopathy.   Neurological: She is alert and oriented to person, place, and time. GCS eye subscore is 4. GCS verbal subscore is 5. GCS motor subscore is 6.   Skin: Skin is warm, dry and intact.   Psychiatric: She has a normal mood and affect. Her speech is normal. Judgment normal. Cognition and memory are normal.         ED Course   Procedures  Labs Reviewed - No data to display       Imaging Results              X-Ray Knee 3 View Left (Final result)  Result time 01/27/25 17:27:27       Final result by César Nugent MD (01/27/25 17:27:27)                   Impression:      Suspected nonspecific suprapatellar joint effusion, without acute displaced fracture-dislocation identified.      Electronically signed by: César Nugent MD  Date:    01/27/2025  Time:    17:27               Narrative:    EXAMINATION:  XR KNEE 3 VIEW LEFT    CLINICAL HISTORY:  Injury, unspecified, initial encounter    TECHNIQUE:  AP, lateral, and Merchant views of the left knee were performed.    COMPARISON:  None    FINDINGS:  Bones are well mineralized for age. Overall alignment is within normal limits. No displaced fracture, dislocation or destructive osseous process.  Minimal spurring of the posterior patella.  Suspected nonspecific suprapatellar joint effusion..  No subcutaneous emphysema or radiopaque foreign body.                                       Medications - No data to display  Medical Decision Making  This patient has an obvious occult injury to the left knee with a left knee suprapatellar effusion.  No evidence of any fracture and no signs of any infection.  Patient will be placed in a knee immobilizer and placed on crutches and referred to Orthopedic surgery.  There is no indication for any further diagnostic evaluation workup.    Amount and/or Complexity of Data Reviewed  Radiology: ordered. Decision-making details documented in ED Course.            Scribe Attestation:   Scribe #1: I performed the above scribed service and the documentation accurately describes the services I performed. I attest to the accuracy of the note.                             This document was produced by a scribe under my direction and in my presence. I agree with the content of the note and have made any necessary edits.     Suhail Sellers MD    01/27/2025 5:37 PM    Clinical Impression:  Final diagnoses:  [T14.90XA] Injury  [M25.562] Acute pain of left knee (Primary)  [M25.469] Suprapatellar effusion of knee          ED  Disposition Condition    Discharge Stable          ED Prescriptions       Medication Sig Dispense Start Date End Date Auth. Provider    naproxen (NAPROSYN) 500 MG tablet Take 1 tablet (500 mg total) by mouth 2 (two) times daily with meals. 20 tablet 1/27/2025 -- Suhail Sellers MD          Follow-up Information       Follow up With Specialties Details Why Contact Info    Orthopedic follow up  Go to                Suhail Sellers MD  01/27/25 1443

## 2025-01-27 NOTE — ED NOTES
Herminia Boyle, a 54 y.o. female presents to the ED w/ complaint of (left) nontraumatic knee pain since yesterday. Pt is ambulatinig on knee.       Chief Complaint   Patient presents with    Knee Pain     Non traumatic left knee pain since yesterday pt ambulatory to triage      Review of patient's allergies indicates:  No Known Allergies  Past Medical History:   Diagnosis Date    Acid reflux     Allergy     Anemia     Hypertension

## 2025-01-27 NOTE — Clinical Note
"Herminia Choinatalia Boyle was seen and treated in our emergency department on 1/27/2025.  She may return to work on 01/30/2025.       If you have any questions or concerns, please don't hesitate to call.      Carrie Sandoval NRP     "

## 2025-05-27 ENCOUNTER — HOSPITAL ENCOUNTER (EMERGENCY)
Facility: HOSPITAL | Age: 55
Discharge: HOME | End: 2025-05-27
Attending: EMERGENCY MEDICINE
Payer: COMMERCIAL

## 2025-05-27 VITALS
WEIGHT: 160 LBS | DIASTOLIC BLOOD PRESSURE: 62 MMHG | OXYGEN SATURATION: 98 % | BODY MASS INDEX: 27.31 KG/M2 | HEART RATE: 72 BPM | SYSTOLIC BLOOD PRESSURE: 116 MMHG | TEMPERATURE: 98 F | RESPIRATION RATE: 20 BRPM | HEIGHT: 64 IN

## 2025-05-27 DIAGNOSIS — M25.561 PAIN AND SWELLING OF KNEE, RIGHT: Primary | ICD-10-CM

## 2025-05-27 DIAGNOSIS — M25.461 PAIN AND SWELLING OF KNEE, RIGHT: Primary | ICD-10-CM

## 2025-05-27 PROCEDURE — 99284 EMERGENCY DEPT VISIT MOD MDM: CPT | Mod: 25,ER

## 2025-05-27 PROCEDURE — 25000003 PHARM REV CODE 250: Mod: ER

## 2025-05-27 PROCEDURE — 96372 THER/PROPH/DIAG INJ SC/IM: CPT

## 2025-05-27 PROCEDURE — 63600175 PHARM REV CODE 636 W HCPCS: Mod: ER

## 2025-05-27 RX ORDER — LIDOCAINE 50 MG/G
1 PATCH TOPICAL DAILY
Qty: 15 PATCH | Refills: 0 | Status: SHIPPED | OUTPATIENT
Start: 2025-05-27

## 2025-05-27 RX ORDER — PREDNISONE 20 MG/1
40 TABLET ORAL
Status: COMPLETED | OUTPATIENT
Start: 2025-05-27 | End: 2025-05-27

## 2025-05-27 RX ORDER — ACETAMINOPHEN 500 MG
1000 TABLET ORAL EVERY 6 HOURS PRN
Qty: 28 TABLET | Refills: 0 | Status: SHIPPED | OUTPATIENT
Start: 2025-05-27

## 2025-05-27 RX ORDER — DICLOFENAC SODIUM 10 MG/G
2 GEL TOPICAL 3 TIMES DAILY PRN
Qty: 100 G | Refills: 0 | Status: SHIPPED | OUTPATIENT
Start: 2025-05-27

## 2025-05-27 RX ORDER — KETOROLAC TROMETHAMINE 30 MG/ML
30 INJECTION, SOLUTION INTRAMUSCULAR; INTRAVENOUS
Status: COMPLETED | OUTPATIENT
Start: 2025-05-27 | End: 2025-05-27

## 2025-05-27 RX ORDER — ACETAMINOPHEN 325 MG/1
650 TABLET ORAL
Status: COMPLETED | OUTPATIENT
Start: 2025-05-27 | End: 2025-05-27

## 2025-05-27 RX ORDER — METHOCARBAMOL 500 MG/1
1000 TABLET, FILM COATED ORAL 3 TIMES DAILY
Qty: 30 TABLET | Refills: 0 | Status: SHIPPED | OUTPATIENT
Start: 2025-05-27 | End: 2025-06-01

## 2025-05-27 RX ADMIN — PREDNISONE 40 MG: 20 TABLET ORAL at 01:05

## 2025-05-27 RX ADMIN — KETOROLAC TROMETHAMINE 30 MG: 30 INJECTION, SOLUTION INTRAMUSCULAR; INTRAVENOUS at 01:05

## 2025-05-27 RX ADMIN — ACETAMINOPHEN 650 MG: 325 TABLET ORAL at 01:05

## 2025-05-27 NOTE — DISCHARGE INSTRUCTIONS

## 2025-05-27 NOTE — ED PROVIDER NOTES
Encounter Date: 5/27/2025       History     Chief Complaint   Patient presents with    Knee Pain     Right knee pain starting 3 days ago      Herminia Boyle is a 54-year-old female with past medical history of hypertension who presents to the emergency department for evaluation of right-sided knee pain.  Denies any trauma to the area, falls, change in exercise, or other specific inciting incident although she does state that she is a  so she is constantly on her feet, changing positions, and lifting heavy objects at work.  There is associated swelling of the knee.  Pain is diffuse and around the knee, worse anteriorly and superiorly.  Denies numbness, weakness, pallor, paresthesia, or loss of function.  Patient has been ambulatory and working.  Naproxen twice daily with minimal relief of symptoms.  She has also been wearing any brace without relief of symptoms.      States that she was evaluated for the same knee at this emergency department recently.  Per chart review, she was seen in this emergency department on January 27, 2025, however she was evaluated for LEFT knee pain.  She had negative x-rays but with significant swelling she was placed in a knee immobilizer, given a pair of crutches, and discharged home with an orthopedics referral.  States that she never heard from orthopedics and has not followed up.    The history is provided by the patient. No  was used.     Review of patient's allergies indicates:  No Known Allergies  Past Medical History:   Diagnosis Date    Acid reflux     Allergy     Anemia     Hypertension      Past Surgical History:   Procedure Laterality Date    TUBAL LIGATION       Family History   Problem Relation Name Age of Onset    Hypertension Mother      Hypertension Sister      Cirrhosis Father      Alcohol abuse Father       Social History[1]  Review of Systems   Constitutional:  Negative for fever.   HENT:  Negative for sore throat.    Respiratory:   Negative for shortness of breath.    Cardiovascular:  Negative for chest pain.   Gastrointestinal:  Negative for nausea.   Genitourinary:  Negative for dysuria.   Musculoskeletal:  Positive for arthralgias and joint swelling. Negative for back pain and gait problem.   Skin:  Negative for rash.   Neurological:  Negative for weakness.   Hematological:  Does not bruise/bleed easily.       Physical Exam     Initial Vitals [05/27/25 1300]   BP Pulse Resp Temp SpO2   116/62 72 20 98.3 °F (36.8 °C) 98 %      MAP       --         Physical Exam    Nursing note and vitals reviewed.  Constitutional: Vital signs are normal. She appears well-developed and well-nourished. She is cooperative. She does not appear ill. No distress.   Well-appearing.  No acute distress.  Alert and interactive.  Very pleasant.   HENT:   Head: Normocephalic and atraumatic.   Right Ear: Hearing and external ear normal.   Left Ear: Hearing and external ear normal.   Nose: Nose normal.   Eyes: Conjunctivae and EOM are normal.   Neck: Phonation normal.   Normal range of motion.  Cardiovascular:  Normal rate and regular rhythm.           No murmur heard.  Pulmonary/Chest: Effort normal. No respiratory distress.   Abdominal: Abdomen is soft. She exhibits no distension. There is no abdominal tenderness.   Musculoskeletal:      Cervical back: Normal range of motion.      Comments: No deformity.  Patient is ambulatory with a steady gait.  Bilateral knees are mildly swollen.  There is a significant suprapatellar effusion on the right.  Full active and passive range of motion of the knee with some pain at the extremes of flexion and extension.  Negative anterior and posterior drawer.  No laxity on varus or valgus stress.  No significant erythema or warmth.  Mild tenderness to palpation over the anterior right knee just proximal to the patella.  DP pulses 2+ and symmetrical bilaterally.  Motor function sensation intact in bilateral lower extremities.      Neurological: She is alert and oriented to person, place, and time. GCS eye subscore is 4. GCS verbal subscore is 5. GCS motor subscore is 6.   Skin: Skin is warm. Capillary refill takes less than 2 seconds.         ED Course   Procedures  Labs Reviewed - No data to display       Imaging Results              X-Ray Knee 3 View Right (Final result)  Result time 05/27/25 14:37:37      Final result by Xavi Tolentino Jr., MD (05/27/25 14:37:37)                   Impression:      See above      Electronically signed by: Xavi Suárez Jr  Date:    05/27/2025  Time:    14:37               Narrative:    EXAMINATION:  XR KNEE 3 VIEW RIGHT    CLINICAL HISTORY:  Pain in right knee    TECHNIQUE:  XR KNEE 3 VIEW RIGHT    COMPARISON:  None    FINDINGS:  Femorotibial joint spaces are preserved.  There is some mild patellofemoral osteoarthritis.  There is a large suprapatellar effusion.  No fracture is seen.                                       Medications   ketorolac injection 30 mg (30 mg Intramuscular Given 5/27/25 1357)   acetaminophen tablet 650 mg (650 mg Oral Given 5/27/25 1357)   predniSONE tablet 40 mg (40 mg Oral Given 5/27/25 1357)     Medical Decision Making  54-year-old female presenting to the emergency department for evaluation of atraumatic right knee pain.  Patient can not think of an inciting incident other than her job as a .  Was recently evaluated in this emergency department for the contralateral knee, although she tells me that the right knee has always been the problem that she has not had any issues with the left knee.  Denies numbness, weakness, pallor, paresthesia, or loss of function.  On exam, she is well-appearing and in no acute distress.  Vitals are within normal limits.  There is a suprapatellar effusion of the left knee, mild swelling of the bilateral knees.  Good active and passive range of motion.  Neurovascularly intact.  Ambulatory.    Differential diagnosis includes  but is not limited to osteoarthritis, rheumatoid arthritis, septic arthritis, gout, pseudogout, ligamentous injury to the ACL, MCL, PCL, or LCL, contusion, fracture, dislocation, patellar bursitis, patellofemoral syndrome     Clinical presentation not consistent with septic arthritis.  Good active and passive range of motion.  Ambulatory.  No erythema or meds.  Gout, pseudogout both considered but felt less likely.  No trauma, low suspicion for fracture or dislocation.  X-ray revealed a suprapatellar effusion consistent with physical exam but no fracture or dislocation.  Unclear etiology of patient's pain.  I discussed multimodal pain management with her including adding steroids, Tylenol, muscle relaxers, topical therapies to help better control her pain.  May benefit from further evaluation with Orthopedics.  Discussed option of drainage of effusion at this time, however after discussion of risks and benefits, patient would prefer to wait until following up with Orthopedics.  Medications listed below electronically prescribed and sent to the patient's preferred pharmacy.  I do not see any indication for immobilization at this time.  Knee is stable, no evidence of fracture or dislocation.  No convincing evidence of meniscal injury that would require immobilization.  Discussed rest, ice, compression, elevation.  Stable for discharge home to outpatient follow up with Orthopedics.  Referral was placed.    Return precautions were discussed, all patient questions were answered, and the patient was agreeable to the plan of care.  She was discharged home in stable condition and will follow up with her primary care provider or return to the emergency department if her symptoms worsen or do not improve.     Amount and/or Complexity of Data Reviewed  Radiology: ordered.    Risk  OTC drugs.  Prescription drug management.                                      Clinical Impression:  Final diagnoses:  [M25.561, M25.461] Pain and  swelling of knee, right (Primary)          ED Disposition Condition    Discharge Stable          ED Prescriptions       Medication Sig Dispense Start Date End Date Auth. Provider    LIDOcaine (LIDODERM) 5 % Place 1 patch onto the skin once daily. Remove & Discard patch within 12 hours or as directed by MD 15 patch 5/27/2025 -- Jaskaran Rogers PA-C    acetaminophen (TYLENOL) 500 MG tablet Take 2 tablets (1,000 mg total) by mouth every 6 (six) hours as needed. 28 tablet 5/27/2025 -- Jaskaran Rogers PA-C    diclofenac sodium (VOLTAREN ARTHRITIS PAIN) 1 % Gel Apply 2 g topically 3 (three) times daily as needed. 100 g 5/27/2025 -- Jaskaran Rogers PA-C    methocarbamoL (ROBAXIN) 500 MG Tab Take 2 tablets (1,000 mg total) by mouth 3 (three) times daily. for 5 days 30 tablet 5/27/2025 6/1/2025 Jaskaran Rogers PA-C          Follow-up Information       Follow up With Specialties Details Why Contact Info    [facility] Orthopedics Call   [address] [facility] [place] [address] [phone]    [facility] - Emergency Dept Emergency Medicine Go to  If symptoms worsen [address] [facility] [place] [address] [phone]                   [1]   Social History  Tobacco Use    Smoking status: Former    Smokeless tobacco: Never   Substance Use Topics    Alcohol use: Not Currently     Alcohol/week: 0.0 standard drinks of alcohol     Comment: socially    Drug use: No        Jaskaran Rogers PA-C  05/27/25 8980

## 2025-07-01 ENCOUNTER — OFFICE VISIT (OUTPATIENT)
Dept: ORTHOPEDICS | Facility: CLINIC | Age: 55
End: 2025-07-01
Payer: COMMERCIAL

## 2025-07-01 VITALS — WEIGHT: 160.06 LBS | HEIGHT: 64 IN | BODY MASS INDEX: 27.33 KG/M2

## 2025-07-01 DIAGNOSIS — M25.461 PAIN AND SWELLING OF KNEE, RIGHT: ICD-10-CM

## 2025-07-01 DIAGNOSIS — M25.561 PAIN AND SWELLING OF KNEE, RIGHT: ICD-10-CM

## 2025-07-01 PROCEDURE — 99204 OFFICE O/P NEW MOD 45 MIN: CPT | Mod: 25,S$GLB,, | Performed by: ORTHOPAEDIC SURGERY

## 2025-07-01 PROCEDURE — 1159F MED LIST DOCD IN RCRD: CPT | Mod: CPTII,S$GLB,, | Performed by: ORTHOPAEDIC SURGERY

## 2025-07-01 PROCEDURE — 99999 PR PBB SHADOW E&M-EST. PATIENT-LVL III: CPT | Mod: PBBFAC,,, | Performed by: ORTHOPAEDIC SURGERY

## 2025-07-01 PROCEDURE — 20610 DRAIN/INJ JOINT/BURSA W/O US: CPT | Mod: RT,S$GLB,, | Performed by: ORTHOPAEDIC SURGERY

## 2025-07-01 PROCEDURE — 3008F BODY MASS INDEX DOCD: CPT | Mod: CPTII,S$GLB,, | Performed by: ORTHOPAEDIC SURGERY

## 2025-07-01 RX ORDER — TRIAMCINOLONE ACETONIDE 40 MG/ML
40 INJECTION, SUSPENSION INTRA-ARTICULAR; INTRAMUSCULAR
Status: DISCONTINUED | OUTPATIENT
Start: 2025-07-01 | End: 2025-07-01 | Stop reason: HOSPADM

## 2025-07-01 RX ADMIN — TRIAMCINOLONE ACETONIDE 40 MG: 40 INJECTION, SUSPENSION INTRA-ARTICULAR; INTRAMUSCULAR at 03:07

## 2025-07-01 NOTE — PROGRESS NOTES
NEW PATIENT ORTHOPAEDIC: Knee    PRIMARY CARE PHYSICIAN: No, Primary Doctor   REFERRING PROVIDER: Self, Aaareferral  No address on file     ASSESSMENT & PLAN:    Impression:  Right Knee Effusion  Right Knee Internal Derangement     Follow Up Plan: PRN     Injection:     Herminia Boyle has physical exam evidence of above and wishes to pursue an injection. We discussed alternative non operative modalities including physical therapy, anti-inflammatories, bracing, maintenance of appropriate weight, rest, ambulatory devices. We discussed the risk, benefits, and expectations regarding injection. They have elected to proceed with injection. Should injections fail next step would be MRI. See procedure note for billing purposes.     Non operative care:    Herminia Boyle has physical exam evidence of above and wishes to pursue an non-operative care. I am recommending the following: injection    Herminia Boyle is a 54-year-old female with past medical history of hypertension who presents for evaluation of right-sided knee pain.  Denies any trauma to the area, falls, change in exercise, or other specific inciting incident although she does state that she is a  so she is constantly on her feet, changing positions, and lifting heavy objects at work.  There is associated swelling of the knee.  Pain is diffuse and around the knee, worse anteriorly and superiorly.  Denies numbness, weakness, pallor, paresthesia, or loss of function.  Patient has been ambulatory and working.  Naproxen twice daily with minimal relief of symptoms.  She has also been wearing any brace without relief of symptoms.  She is not report instability or mechanical symptoms in her knee.  She has good and bad days with regard to this knee.  Worse at the end of the day.  Clinically I can not reproduce her pain.  She does have swelling in his knee.  We discussed treatment options including attempted definitive diagnosis with obtaining MRI versus  pills versus injections versus therapy.  She elected for injection today.  If that does not provide long-lasting relief I think next step would be MRI     The patient has been ordered:  Office Intraarticular injection    CONSULTS:   None    ACTIVE PROBLEM LIST  Problem List[1]        SUBJECTIVE    CHIEF COMPLAINT: Knee Pain    HPI:   Herminia Boyle is a 54 y.o. female here for evaluation and management of right knee pain. There is not a specific incident that brought about this pain. she has had progressive problems with the knee(s) starting 6 months ago but is now progressing to interfere with activities which include: functional household ADL's and standing for prolonged periods of time    Currently the pain in the joint is rated at mild with activity. The pain is intermittent and is located in the knee, located anterior and without radiation. The pain is described as aching and throbbing. Relieving factors include rest, over the counter medication , and repositioning.     There is not associated Catching, Clicking, and Popping.     Herminia Boyle has no additional complaints.     PROGRESSIVE SYMPTOMS:  Pain impacting work  Pain worsened by weight bearing  Pain effecting living situation    FUNCTIONAL STATUS:   Participate in recreational activities     PREVIOUS TREATMENTS:  Medical: None  Physical Therapy: Braces and Activities Modified   Previous Orthopaedic Surgery: None    REVIEW OF SYSTEMS:  PAIN ASSESSMENT:  See HPI.  MUSCULOSKELETAL: See HPI.  OTHER 10 point review of systems is negative except as stated in HPI above    PAST MEDICAL HISTORY   has a past medical history of Acid reflux, Allergy, Anemia, and Hypertension.     PAST SURGICAL HISTORY   has a past surgical history that includes Tubal ligation.     FAMILY HISTORY  family history includes Alcohol abuse in her father; Cirrhosis in her father; Hypertension in her mother and sister.     SOCIAL HISTORY   reports that she has quit smoking. She has  "never used smokeless tobacco. She reports that she does not currently use alcohol. She reports that she does not use drugs.     ALLERGIES   Review of patient's allergies indicates:  No Known Allergies     MEDICATIONS  Medications Ordered Prior to Encounter[2]       PHYSICAL EXAM   height is 5' 4" (1.626 m) and weight is 72.6 kg (160 lb 0.9 oz).   Body mass index is 27.47 kg/m².      All other systems deferred.  GENERAL:  No acute distress  HABITUS: Normal  GAIT: Antalgic  SKIN: Normal  and No erythema, warmth, fluctuance     KNEE EXAM:    right:   Effusion: Small joint effusion  TTP: No over Medial/Lateral joint line, MCL, LCL, IT band, Pes bursa   No crepitus with passive knee ROM  Passive ROM: Extension 0, Flexion 130  No pain with manipulation of patella  Stable to varus/valgus stress. No increased laxity to anterior/posterior drawer testing  negative Naldo's test  No pain with IR/ER rotation of the hip  5/5 strength in knee flexion and extension, ankle plantarflexion and dorsiflexion  Neurovascular Status: Sensation intact to light touch in Sural, Saphenous, SPN, DPN, Tibial nerve distribution  2+ pulse DP/PT, normal capillary refill, foot has normal warmth    DATA:  Diagnostic tests reviewed for today's visit:     3v of the knee radiographs appears normal for age. There is good alignment with no evidence of fracture, bony abnormalities or signficant degenerative changes.         [1] There is no problem list on file for this patient.  [2]   Current Outpatient Medications on File Prior to Visit   Medication Sig Dispense Refill    acetaminophen (TYLENOL) 500 MG tablet Take 2 tablets (1,000 mg total) by mouth every 6 (six) hours as needed. 28 tablet 0    diclofenac sodium (VOLTAREN ARTHRITIS PAIN) 1 % Gel Apply 2 g topically 3 (three) times daily as needed. 100 g 0    LIDOcaine (LIDODERM) 5 % Place 1 patch onto the skin once daily. Remove & Discard patch within 12 hours or as directed by MD 15 patch 0    " lisinopril (PRINIVIL,ZESTRIL) 20 MG tablet Take 20 mg by mouth once daily.   5    naproxen (NAPROSYN) 500 MG tablet Take 1 tablet (500 mg total) by mouth 2 (two) times daily with meals. 20 tablet 0    promethazine (PHENERGAN) 25 MG tablet Take 1 tablet (25 mg total) by mouth every 6 (six) hours as needed for Nausea. MAY CAUSE DROWSINESS 15 tablet 0    albuterol (PROVENTIL/VENTOLIN HFA) 90 mcg/actuation inhaler Inhale 1-2 puffs into the lungs every 6 (six) hours as needed for Wheezing. Rescue 18 g 0    azelastine (ASTELIN) 137 mcg (0.1 %) nasal spray 1 spray (137 mcg total) by Nasal route 2 (two) times daily. 30 mL 0    loratadine (CLARITIN) 10 mg tablet Take 1 tablet (10 mg total) by mouth once daily. 30 tablet 0    pantoprazole (PROTONIX) 40 MG tablet Take 1 tablet (40 mg total) by mouth once daily. 30 tablet 0     No current facility-administered medications on file prior to visit.

## 2025-07-01 NOTE — PROCEDURES
Large Joint Aspiration/Injection: R knee    Date/Time: 7/1/2025 3:00 PM    Performed by: Toño Rachel MD  Authorized by: Toño Rachel MD    Consent Done?:  Yes (Verbal)  Indications:  Pain and joint swelling  Prep: patient was prepped and draped in usual sterile fashion      Local anesthesia used?: Yes    Anesthesia:  Local infiltration  Local anesthetic:  Topical anesthetic and lidocaine 1% without epinephrine    Details:  Needle Size:  22 G  Approach:  Anterolateral  Location:  Knee  Site:  R knee  Medications:  40 mg triamcinolone acetonide 40 mg/mL  Patient tolerance:  Patient tolerated the procedure well with no immediate complications

## 2025-07-31 ENCOUNTER — HOSPITAL ENCOUNTER (EMERGENCY)
Facility: HOSPITAL | Age: 55
Discharge: HOME | End: 2025-07-31
Attending: INTERNAL MEDICINE
Payer: COMMERCIAL

## 2025-07-31 VITALS
SYSTOLIC BLOOD PRESSURE: 125 MMHG | DIASTOLIC BLOOD PRESSURE: 73 MMHG | RESPIRATION RATE: 16 BRPM | TEMPERATURE: 98 F | WEIGHT: 163 LBS | HEIGHT: 64 IN | OXYGEN SATURATION: 99 % | BODY MASS INDEX: 27.83 KG/M2 | HEART RATE: 72 BPM

## 2025-07-31 DIAGNOSIS — M25.562 ACUTE PAIN OF LEFT KNEE: Primary | ICD-10-CM

## 2025-07-31 DIAGNOSIS — M25.562 LEFT KNEE PAIN: ICD-10-CM

## 2025-07-31 PROCEDURE — 63600175 PHARM REV CODE 636 W HCPCS: Mod: JZ,TB,ER | Performed by: INTERNAL MEDICINE

## 2025-07-31 PROCEDURE — 99284 EMERGENCY DEPT VISIT MOD MDM: CPT | Mod: 25,ER

## 2025-07-31 PROCEDURE — 96372 THER/PROPH/DIAG INJ SC/IM: CPT | Performed by: INTERNAL MEDICINE

## 2025-07-31 RX ORDER — KETOROLAC TROMETHAMINE 30 MG/ML
60 INJECTION, SOLUTION INTRAMUSCULAR; INTRAVENOUS
Status: COMPLETED | OUTPATIENT
Start: 2025-07-31 | End: 2025-07-31

## 2025-07-31 RX ORDER — IBUPROFEN 800 MG/1
800 TABLET, FILM COATED ORAL EVERY 8 HOURS PRN
Qty: 30 TABLET | Refills: 0 | Status: SHIPPED | OUTPATIENT
Start: 2025-07-31

## 2025-07-31 RX ADMIN — KETOROLAC TROMETHAMINE 60 MG: 30 INJECTION, SOLUTION INTRAMUSCULAR; INTRAVENOUS at 08:07

## 2025-07-31 NOTE — Clinical Note
"Herminia Dowd" [place] was seen and treated in our emergency department on 7/31/2025.  She may return to work on 08/02/2025.       If you have any questions or concerns, please don't hesitate to call.       RN    "

## 2025-08-01 NOTE — ED PROVIDER NOTES
Encounter Date: 7/31/2025    SCRIBE #1 NOTE: I, Mell Salvador, am scribing for, and in the presence of,  Rajinder Kearney MD. I have scribed the following portions of the note - Other sections scribed: HPI, ROS, PE.       History     Chief Complaint   Patient presents with    Knee Pain     Pt presents w/ a c/o of left knee for approximately two days. Denies any injury or trauma.      54-year-old female, with a PMHx of HTN, presents to the ED for evaluation of atraumatic left knee pain with associated swelling x 2 days. No attempted treatment. Patient reports [place] and she [place] better work shoes. She reports she had the same symptoms in her right knee which resolved after her orthopedist gave her an steroid injection.     XR Left Knee on 1/27/2025 revealed: Bones are well mineralized for age. Overall alignment is within normal limits. No displaced fracture, dislocation or destructive osseous process.  Minimal spurring of the posterior patella.  Suspected nonspecific suprapatellar joint effusion..  No subcutaneous emphysema or radiopaque foreign body.    The history is provided by the patient. No  was used.     Review of patient's allergies indicates:  No Known Allergies  Past Medical History:   Diagnosis Date    Acid reflux     Allergy     Anemia     Hypertension      Past Surgical History:   Procedure Laterality Date    TUBAL LIGATION       Family History   Problem Relation Name Age of Onset    Hypertension Mother      Hypertension Sister      Cirrhosis Father      Alcohol abuse Father       Social History[1]  Review of Systems   Constitutional: Negative.    HENT: Negative.     Eyes: Negative.    Cardiovascular: Negative.    Gastrointestinal: Negative.    Endocrine: Negative.    Musculoskeletal:  Positive for arthralgias and joint swelling.   Skin: Negative.    Allergic/Immunologic: Negative.    Neurological: Negative.    Hematological:  Negative.    Psychiatric/Behavioral: Negative.     All other systems reviewed and are negative.      Physical Exam     Initial Vitals [07/31/25 1827]   BP Pulse Resp Temp SpO2   133/77 76 20 98.2 °F (36.8 °C) 99 %      MAP       --         Physical Exam    Nursing note reviewed.  Constitutional: She appears well-developed and well-nourished.   HENT:   Head: Normocephalic and atraumatic.   Eyes: EOM are normal.   Neck:   Normal range of motion.  Cardiovascular:  Normal rate and regular rhythm.           Pulmonary/Chest: Breath sounds normal. No respiratory distress.   Abdominal: Abdomen is soft. Bowel sounds are normal.   Musculoskeletal:         General: Normal range of motion.      Cervical back: Normal range of motion.      Comments: Bilateral lower extremities are neurovascularly intact. Slight edema to left knee. No erythema or tenderness to palpation. No gross deformity.      Neurological: She is alert and oriented to person, place, and time. She has normal strength.   Skin: Skin is warm.         ED Course   Procedures  Labs Reviewed - No data to display       Imaging Results              X-Ray Knee 3 View Left (Final result)  Result time 07/31/25 19:22:48      Final result by Vielka Shen MD (07/31/25 19:22:48)                   Impression:      No acute bony abnormality detected.  Moderate suprapatellar effusion.      Electronically signed by: Vielka Shen  Date:    07/31/2025  Time:    19:22               Narrative:    EXAMINATION:  THREE VIEWS OF THE LEFT KNEE    CLINICAL HISTORY:  Pain in left knee    TECHNIQUE:  AP, oblique, and lateral view of the left knee    COMPARISON:  01/27/2025    FINDINGS:  Three views of the left knee demonstrate no acute fracture or dislocation.  Minimal irregularity is seen at the posterior aspect of the patella on the lateral radiograph, which was seen on the previous exam.  Moderate suprapatellar effusion is present.                                        Medications   ketorolac injection 60 mg (60 mg Intramuscular Given 7/31/25 2000)     Medical Decision Making  54-year-old female, with a PMHx of HTN, presents to the ED for evaluation of atraumatic left knee pain with associated swelling x 2 days. No attempted treatment. Patient reports she is [place] and she needs to buy better work shoes. She reports she had the same symptoms in her right knee which resolved after her orthopedist gave her an steroid injection.   Course of ED stay:   X-ray of left knee reveals no acute disease.  Moderate suprapatellar effusion is present.  Instructions for left knee pain were given as well as Toradol in the ED and a prescription for ibuprofen.  Patient was advised to follow up with her primary care physician within the next week for re-evaluation/return to the emergency department if condition worsens.    Amount and/or Complexity of Data Reviewed  External Data Reviewed: radiology.    Risk  Prescription drug management.            Scribe Attestation:   Scribe #1: I performed the above scribed service and the documentation accurately describes the services I performed. I attest to the accuracy of the note.                                 This document was produced by a scribe under my direction and in my presence. I agree with the content of the note and have made any necessary edits.     Dr. Kearney    08/01/2025 12:41 AM    Clinical Impression:  Final diagnoses:  [M25.562] Left knee pain  [M25.562] Acute pain of left knee (Primary)          ED Disposition Condition    Discharge Stable          ED Prescriptions       Medication Sig Dispense Start Date End Date Auth. Provider    ibuprofen (ADVIL,MOTRIN) 800 MG tablet Take 1 tablet (800 mg total) by mouth every 8 (eight) hours as needed for Pain. 30 tablet 7/31/2025 -- Rajinder Kearney MD          Follow-up Information    None                [1]   Social History  Tobacco Use    Smoking  status: Former    Smokeless tobacco: Never   Substance Use Topics    Alcohol use: Not Currently     Alcohol/week: 0.0 standard drinks of alcohol     Comment: socially    Drug use: No        Rajinder Kearney MD  08/01/25 0041

## 2025-08-12 ENCOUNTER — OFFICE VISIT (OUTPATIENT)
Dept: ORTHOPEDICS | Facility: CLINIC | Age: 55
End: 2025-08-12
Payer: COMMERCIAL

## 2025-08-12 DIAGNOSIS — M23.92 INTERNAL DERANGEMENT OF LEFT KNEE: ICD-10-CM

## 2025-08-12 DIAGNOSIS — M25.462 EFFUSION OF LEFT KNEE: Primary | ICD-10-CM

## 2025-08-12 PROCEDURE — 99213 OFFICE O/P EST LOW 20 MIN: CPT | Mod: 25,S$GLB,, | Performed by: ORTHOPAEDIC SURGERY

## 2025-08-12 PROCEDURE — 99999 PR PBB SHADOW E&M-EST. PATIENT-LVL III: CPT | Mod: PBBFAC,,, | Performed by: ORTHOPAEDIC SURGERY

## 2025-08-12 PROCEDURE — 20610 DRAIN/INJ JOINT/BURSA W/O US: CPT | Mod: LT,S$GLB,, | Performed by: ORTHOPAEDIC SURGERY

## 2025-08-12 PROCEDURE — 1159F MED LIST DOCD IN RCRD: CPT | Mod: CPTII,S$GLB,, | Performed by: ORTHOPAEDIC SURGERY

## 2025-08-12 RX ORDER — TRIAMCINOLONE ACETONIDE 40 MG/ML
40 INJECTION, SUSPENSION INTRA-ARTICULAR; INTRAMUSCULAR
Status: DISCONTINUED | OUTPATIENT
Start: 2025-08-12 | End: 2025-08-12 | Stop reason: HOSPADM

## 2025-08-12 RX ADMIN — TRIAMCINOLONE ACETONIDE 40 MG: 40 INJECTION, SUSPENSION INTRA-ARTICULAR; INTRAMUSCULAR at 08:08
